# Patient Record
Sex: FEMALE | Race: BLACK OR AFRICAN AMERICAN | NOT HISPANIC OR LATINO
[De-identification: names, ages, dates, MRNs, and addresses within clinical notes are randomized per-mention and may not be internally consistent; named-entity substitution may affect disease eponyms.]

---

## 2023-11-07 ENCOUNTER — APPOINTMENT (OUTPATIENT)
Dept: OTOLARYNGOLOGY | Facility: CLINIC | Age: 83
End: 2023-11-07
Payer: MEDICARE

## 2023-11-07 VITALS
DIASTOLIC BLOOD PRESSURE: 73 MMHG | WEIGHT: 180 LBS | HEIGHT: 65 IN | HEART RATE: 82 BPM | BODY MASS INDEX: 29.99 KG/M2 | SYSTOLIC BLOOD PRESSURE: 170 MMHG | TEMPERATURE: 96.4 F

## 2023-11-07 DIAGNOSIS — Z83.3 FAMILY HISTORY OF DIABETES MELLITUS: ICD-10-CM

## 2023-11-07 DIAGNOSIS — Z80.9 FAMILY HISTORY OF MALIGNANT NEOPLASM, UNSPECIFIED: ICD-10-CM

## 2023-11-07 PROBLEM — Z00.00 ENCOUNTER FOR PREVENTIVE HEALTH EXAMINATION: Status: ACTIVE | Noted: 2023-11-07

## 2023-11-07 PROCEDURE — 99204 OFFICE O/P NEW MOD 45 MIN: CPT | Mod: 25

## 2023-11-07 PROCEDURE — 31231 NASAL ENDOSCOPY DX: CPT

## 2023-11-07 RX ORDER — SULFAMETHOXAZOLE AND TRIMETHOPRIM 800; 160 MG/1; MG/1
800-160 TABLET ORAL TWICE DAILY
Qty: 28 | Refills: 0 | Status: ACTIVE | COMMUNITY
Start: 2023-11-07 | End: 1900-01-01

## 2023-11-07 RX ORDER — PREDNISONE 10 MG/1
10 TABLET ORAL
Qty: 36 | Refills: 0 | Status: ACTIVE | COMMUNITY
Start: 2023-11-07 | End: 1900-01-01

## 2023-11-07 RX ORDER — FLUTICASONE PROPIONATE 44 UG/1
44 AEROSOL, METERED RESPIRATORY (INHALATION)
Refills: 0 | Status: ACTIVE | COMMUNITY

## 2023-11-07 RX ORDER — INSULIN ASPART 100 [IU]/ML
100 INJECTION, SOLUTION INTRAVENOUS; SUBCUTANEOUS
Refills: 0 | Status: ACTIVE | COMMUNITY

## 2023-11-07 RX ORDER — ALBUTEROL SULFATE 2.5 MG/3ML
(2.5 MG/3ML) VIAL, NEBULIZER (ML) INHALATION
Refills: 0 | Status: ACTIVE | COMMUNITY

## 2023-11-13 LAB — EAR NOSE AND THROAT CULTURE: ABNORMAL

## 2023-11-18 ENCOUNTER — APPOINTMENT (OUTPATIENT)
Dept: CT IMAGING | Facility: CLINIC | Age: 83
End: 2023-11-18
Payer: MEDICARE

## 2023-11-18 ENCOUNTER — OUTPATIENT (OUTPATIENT)
Dept: OUTPATIENT SERVICES | Facility: HOSPITAL | Age: 83
LOS: 1 days | End: 2023-11-18
Payer: MEDICARE

## 2023-11-18 DIAGNOSIS — J45.909 UNSPECIFIED ASTHMA, UNCOMPLICATED: ICD-10-CM

## 2023-11-18 DIAGNOSIS — J33.9 NASAL POLYP, UNSPECIFIED: ICD-10-CM

## 2023-11-18 DIAGNOSIS — J32.4 CHRONIC PANSINUSITIS: ICD-10-CM

## 2023-11-18 PROCEDURE — 70486 CT MAXILLOFACIAL W/O DYE: CPT

## 2023-11-18 PROCEDURE — 70486 CT MAXILLOFACIAL W/O DYE: CPT | Mod: 26,MH

## 2023-11-28 ENCOUNTER — APPOINTMENT (OUTPATIENT)
Dept: PULMONOLOGY | Facility: CLINIC | Age: 83
End: 2023-11-28
Payer: MEDICARE

## 2023-11-28 ENCOUNTER — LABORATORY RESULT (OUTPATIENT)
Age: 83
End: 2023-11-28

## 2023-11-28 VITALS
OXYGEN SATURATION: 97 % | DIASTOLIC BLOOD PRESSURE: 60 MMHG | HEIGHT: 65 IN | HEART RATE: 81 BPM | SYSTOLIC BLOOD PRESSURE: 125 MMHG | WEIGHT: 180.5 LBS | BODY MASS INDEX: 30.07 KG/M2 | TEMPERATURE: 97.4 F

## 2023-11-28 DIAGNOSIS — Z86.73 PERSONAL HISTORY OF TRANSIENT ISCHEMIC ATTACK (TIA), AND CEREBRAL INFARCTION W/OUT RESIDUAL DEFICITS: ICD-10-CM

## 2023-11-28 DIAGNOSIS — Z85.3 PERSONAL HISTORY OF MALIGNANT NEOPLASM OF BREAST: ICD-10-CM

## 2023-11-28 DIAGNOSIS — Z86.39 PERSONAL HISTORY OF OTHER ENDOCRINE, NUTRITIONAL AND METABOLIC DISEASE: ICD-10-CM

## 2023-11-28 PROCEDURE — 94060 EVALUATION OF WHEEZING: CPT

## 2023-11-28 PROCEDURE — 95012 NITRIC OXIDE EXP GAS DETER: CPT

## 2023-11-28 PROCEDURE — 99204 OFFICE O/P NEW MOD 45 MIN: CPT | Mod: 25

## 2023-11-28 PROCEDURE — 94729 DIFFUSING CAPACITY: CPT

## 2023-11-28 PROCEDURE — 94727 GAS DIL/WSHOT DETER LNG VOL: CPT

## 2023-11-29 LAB
BASOPHILS # BLD AUTO: 0.08 K/UL
BASOPHILS NFR BLD AUTO: 1.3 %
EOSINOPHIL # BLD AUTO: 0.47 K/UL
EOSINOPHIL NFR BLD AUTO: 7.8 %
HCT VFR BLD CALC: 34.8 %
HGB BLD-MCNC: 12 G/DL
IMM GRANULOCYTES NFR BLD AUTO: 0.2 %
LYMPHOCYTES # BLD AUTO: 2.07 K/UL
LYMPHOCYTES NFR BLD AUTO: 34.3 %
MAN DIFF?: NORMAL
MCHC RBC-ENTMCNC: 28.6 PG
MCHC RBC-ENTMCNC: 34.5 GM/DL
MCV RBC AUTO: 82.9 FL
MONOCYTES # BLD AUTO: 0.39 K/UL
MONOCYTES NFR BLD AUTO: 6.5 %
NEUTROPHILS # BLD AUTO: 3.01 K/UL
NEUTROPHILS NFR BLD AUTO: 49.9 %
PLATELET # BLD AUTO: 357 K/UL
RBC # BLD: 4.2 M/UL
RBC # FLD: 16.6 %
WBC # FLD AUTO: 6.03 K/UL

## 2023-11-30 LAB
A ALTERNATA IGE QN: <0.1 KUA/L
A FUMIGATUS IGE QN: <0.1 KUA/L
C ALBICANS IGE QN: <0.1 KUA/L
C HERBARUM IGE QN: <0.1 KUA/L
CAT DANDER IGE QN: <0.1 KUA/L
COMMON RAGWEED IGE QN: 0.37 KUA/L
D FARINAE IGE QN: <0.1 KUA/L
D PTERONYSS IGE QN: <0.1 KUA/L
DEPRECATED A ALTERNATA IGE RAST QL: 0 (ref 0–?)
DEPRECATED A FUMIGATUS IGE RAST QL: 0 (ref 0–?)
DEPRECATED C ALBICANS IGE RAST QL: 0
DEPRECATED C HERBARUM IGE RAST QL: 0 (ref 0–?)
DEPRECATED CAT DANDER IGE RAST QL: 0 (ref 0–?)
DEPRECATED COMMON RAGWEED IGE RAST QL: 1 (ref 0–?)
DEPRECATED D FARINAE IGE RAST QL: 0 (ref 0–?)
DEPRECATED D PTERONYSS IGE RAST QL: 0 (ref 0–?)
DEPRECATED DOG DANDER IGE RAST QL: 2 (ref 0–?)
DEPRECATED M RACEMOSUS IGE RAST QL: 0
DEPRECATED ROACH IGE RAST QL: NORMAL (ref 0–?)
DEPRECATED TIMOTHY IGE RAST QL: 4 (ref 0–?)
DEPRECATED WHITE OAK IGE RAST QL: 2 (ref 0–?)
DOG DANDER IGE QN: 0.84 KUA/L
M RACEMOSUS IGE QN: <0.1 KUA/L
ROACH IGE QN: 0.21 KUA/L
TIMOTHY IGE QN: 34.2 KUA/L
TOTAL IGE SMQN RAST: 1853 KU/L
WHITE OAK IGE QN: 0.7 KUA/L

## 2023-12-01 ENCOUNTER — APPOINTMENT (OUTPATIENT)
Dept: OTOLARYNGOLOGY | Facility: CLINIC | Age: 83
End: 2023-12-01
Payer: MEDICARE

## 2023-12-01 VITALS — BODY MASS INDEX: 29.99 KG/M2 | TEMPERATURE: 97.4 F | HEIGHT: 65 IN | WEIGHT: 180 LBS

## 2023-12-01 PROCEDURE — 99213 OFFICE O/P EST LOW 20 MIN: CPT | Mod: 25

## 2023-12-01 PROCEDURE — 31231 NASAL ENDOSCOPY DX: CPT

## 2023-12-21 RX ORDER — SULFAMETHOXAZOLE AND TRIMETHOPRIM 800; 160 MG/1; MG/1
800-160 TABLET ORAL TWICE DAILY
Qty: 28 | Refills: 0 | Status: ACTIVE | COMMUNITY
Start: 2023-12-21 | End: 1900-01-01

## 2024-01-24 ENCOUNTER — APPOINTMENT (OUTPATIENT)
Dept: PULMONOLOGY | Facility: CLINIC | Age: 84
End: 2024-01-24
Payer: MEDICARE

## 2024-01-24 VITALS
DIASTOLIC BLOOD PRESSURE: 60 MMHG | BODY MASS INDEX: 30.07 KG/M2 | SYSTOLIC BLOOD PRESSURE: 130 MMHG | OXYGEN SATURATION: 97 % | HEIGHT: 65 IN | WEIGHT: 180.5 LBS | HEART RATE: 78 BPM | TEMPERATURE: 97.2 F

## 2024-01-24 PROCEDURE — 94010 BREATHING CAPACITY TEST: CPT

## 2024-01-24 PROCEDURE — 99213 OFFICE O/P EST LOW 20 MIN: CPT | Mod: 25

## 2024-01-24 PROCEDURE — 95012 NITRIC OXIDE EXP GAS DETER: CPT

## 2024-01-24 RX ORDER — BUDESONIDE, GLYCOPYRROLATE, AND FORMOTEROL FUMARATE 160; 9; 4.8 UG/1; UG/1; UG/1
160-9-4.8 AEROSOL, METERED RESPIRATORY (INHALATION)
Qty: 3 | Refills: 3 | Status: ACTIVE | COMMUNITY
Start: 2023-11-28 | End: 1900-01-01

## 2024-01-28 LAB
A FLAVUS AB FLD QL: NEGATIVE
A FUMIGATUS AB FLD QL: NEGATIVE
A NIGER AB FLD QL: NEGATIVE

## 2024-01-31 ENCOUNTER — APPOINTMENT (OUTPATIENT)
Dept: OTOLARYNGOLOGY | Facility: HOSPITAL | Age: 84
End: 2024-01-31

## 2024-01-31 ENCOUNTER — NON-APPOINTMENT (OUTPATIENT)
Age: 84
End: 2024-01-31

## 2024-02-06 ENCOUNTER — APPOINTMENT (OUTPATIENT)
Dept: CT IMAGING | Facility: IMAGING CENTER | Age: 84
End: 2024-02-06
Payer: MEDICARE

## 2024-02-06 ENCOUNTER — OUTPATIENT (OUTPATIENT)
Dept: OUTPATIENT SERVICES | Facility: HOSPITAL | Age: 84
LOS: 1 days | End: 2024-02-06
Payer: MEDICARE

## 2024-02-06 DIAGNOSIS — J45.909 UNSPECIFIED ASTHMA, UNCOMPLICATED: ICD-10-CM

## 2024-02-06 PROCEDURE — 71250 CT THORAX DX C-: CPT | Mod: 26,MH

## 2024-02-06 PROCEDURE — 71250 CT THORAX DX C-: CPT

## 2024-02-08 ENCOUNTER — APPOINTMENT (OUTPATIENT)
Dept: OTOLARYNGOLOGY | Facility: CLINIC | Age: 84
End: 2024-02-08
Payer: MEDICARE

## 2024-02-08 PROCEDURE — 99213 OFFICE O/P EST LOW 20 MIN: CPT

## 2024-02-08 NOTE — CONSULT LETTER
[Dear  ___] : Dear  [unfilled], [Courtesy Letter:] : I had the pleasure of seeing your patient, [unfilled], in my office today. [Consult Closing:] : Thank you very much for allowing me to participate in the care of this patient.  If you have any questions, please do not hesitate to contact me. [Sincerely,] : Sincerely, [FreeTextEntry3] : Jeremie Krause MD Department of Otolaryngology - Head and Neck Surgery [DrPau  ___] : Dr. MA

## 2024-02-08 NOTE — PROCEDURE
[FreeTextEntry3] : Nasal Endoscopy: near complete nasal airway obstruction due to polyps w mucoid debris and crusting

## 2024-02-08 NOTE — HISTORY OF PRESENT ILLNESS
[de-identified] : 84F here in followup.  She had been scheduled for sinus surgery 1/31/24 but surgery was cancelled the previous day since CXR appeared abnormal. She has since gotten CT chest as recommended. She has not spoken with her PCP or pulmonologist for review yet.  She has severe pansinusitis w polyposis/AERD with eosinophilia and elevated IgEs in addition to severe asthma on triple therapy. For the past year and half or so, she has been suffering from severe nasal congestion, green/yellow mucus, thick postnasal drip, shortness of breath and absent smell/taste with difficult to control asthma and poor sleep. She feels this all started after going on dupixent 5/2022, after which she developed a sinusitis followed by pneumonia with persistent asthma. She then suffered a stroke 12/2022 and is on AC and sx persist.  She has long standing h/o chronic sinusitis/polyposis with asthma and ASA/NSAID allergy. She has SERENITY and has CPAP but just got a new machine which does not work as well.  CT Sinus 11/18/23 (I reviewed): -near complete pansinus opacification w diffusely thickened osteitic bone throughout -soft tissue polypoid extension into both nasal cavities   ROS otherwise unremarkable.

## 2024-02-08 NOTE — ASSESSMENT
[FreeTextEntry1] : 84F here in followup. She had been scheduled for sinus surgery 1/31/24 but surgery was cancelled the previous day since CXR appeared abnormal. She has since gotten CT chest as recommended. She has not spoken with her PCP or pulmonologist for review yet. She has severe pansinusitis w polyposis/AERD with eosinophilia and elevated IgEs in addition to severe asthma on triple therapy.  For the past year and half or so, she has been suffering from debilitating constellation of sx including severe nasal congestion, green/yellow mucus, thick postnasal drip, shortness of breath and absent smell/taste with difficult to control asthma and poor sleep. She feels this all started after going on dupixent 5/2022, after which she developed a sinusitis followed by pneumonia with persistent asthma. She then suffered a stroke 12/2022 and is on AC and sx persist. She has long standing h/o chronic sinusitis/polyposis with asthma and ASA/NSAID allergy and underwent prior surgeries years ago. She has SERENITY and has CPAP but just got a new machine which does not work as well. CT shows near complete pansinus opacification w diffusely thickened osteitic bone throughout with-soft tissue polypoid extension into both nasal cavities. On exam, nasal endoscopy shows near complete nasal airway obstruction due to polyps, debris and crusting. She has aspirin-exacerbated respiratory disease which places her at the inflammatory extreme of upper and lower airway inflammation. There is a very high inflammatory polyp burden on exam and imaging which is significantly affecting her quality of life.  I again reiterated need for sinus surgery in way to reset her inflammatory cycle. She just had CT chest as recommended and I await clearance from PCP and pulmonologist so we can move forward safely with surgery as planned, which I reiterated w pt. I let Dr. Becerra know. Pt will keep me informed.

## 2024-02-08 NOTE — DATA REVIEWED
[de-identified] : CT Sinus 11/18/23: FINDINGS: Sinocranial & sinoorbital junctions: The lamina papyracea, cribriform plates and fovea ethmoidalis are intact.  Nasal septum and nasal cavity: Nasal prosthesis is noted overlying the nasal bones/cartilage. S-shaped nasal septal deviation. Soft tissue opacification throughout the bilateral nasal passages corresponding with history of polyposis.  Frontal sinuses, drainage pathways, and associated anatomic variants: Near-complete opacification of the frontal sinuses with chronic wall thickening. Opacified bilateral frontal outflow tracts.  Ethmoid sinuses: Near complete opacification bilaterally.  Sphenoid sinuses, drainage pathways, and associated variants: Mild right and severe left sphenoid sinus opacification. Opacification of the bilateral sphenoethmoidal recesses. The carotid canals are covered by bone.  Maxillary sinuses, drainage pathways, and associated variants: Complete opacification of the bilateral maxillary sinuses and ostiomeatal units. Central mild hyperdensity could represent inspissated secretions and/or fungal overgrowth.  Other: Partially visualized intracranial structures: Normal Orbits: Normal Mastoid air cells: Trace left mastoid effusion.   IMPRESSION: Severe inflammatory changes throughout the paranasal sinuses and their respective drainage pathways.  Soft tissue opacification in the bilateral nasal passages corresponding with given history of polyposis.  S-shaped nasal septal deviation.

## 2024-02-28 ENCOUNTER — APPOINTMENT (OUTPATIENT)
Dept: PULMONOLOGY | Facility: CLINIC | Age: 84
End: 2024-02-28
Payer: MEDICARE

## 2024-02-28 VITALS
TEMPERATURE: 97.3 F | OXYGEN SATURATION: 96 % | SYSTOLIC BLOOD PRESSURE: 128 MMHG | BODY MASS INDEX: 30.99 KG/M2 | DIASTOLIC BLOOD PRESSURE: 76 MMHG | HEART RATE: 87 BPM | HEIGHT: 65 IN | WEIGHT: 186 LBS

## 2024-02-28 PROCEDURE — 99213 OFFICE O/P EST LOW 20 MIN: CPT

## 2024-02-28 RX ORDER — PREDNISONE 20 MG/1
20 TABLET ORAL
Qty: 6 | Refills: 0 | Status: ACTIVE | COMMUNITY
Start: 2024-02-28 | End: 1900-01-01

## 2024-03-14 ENCOUNTER — APPOINTMENT (OUTPATIENT)
Dept: CT IMAGING | Facility: CLINIC | Age: 84
End: 2024-03-14

## 2024-03-19 ENCOUNTER — TRANSCRIPTION ENCOUNTER (OUTPATIENT)
Age: 84
End: 2024-03-19

## 2024-03-19 NOTE — ASU PATIENT PROFILE, ADULT - NSICDXPASTSURGICALHX_GEN_ALL_CORE_FT
PAST SURGICAL HISTORY:  H/O lumpectomy     H/O nasal polypectomy     History of tonsillectomy and adenoidectomy     S/P dilatation and curettage

## 2024-03-19 NOTE — ASU PATIENT PROFILE, ADULT - NS PREOP UNDERSTANDS INFO
no solids after 10pm, clears allowed up to 630am, bring ID and insurance card, no valuables or jewelry, wear comfortable clothing/yes

## 2024-03-19 NOTE — ASU PATIENT PROFILE, ADULT - NSICDXPASTMEDICALHX_GEN_ALL_CORE_FT
PAST MEDICAL HISTORY:  Breast cancer     Cataract, bilateral     CVA (cerebral vascular accident)     Diabetes mellitus     HTN (hypertension)     SERENITY (obstructive sleep apnea)      Suture Removal: 14 days

## 2024-03-20 ENCOUNTER — RESULT REVIEW (OUTPATIENT)
Age: 84
End: 2024-03-20

## 2024-03-20 ENCOUNTER — APPOINTMENT (OUTPATIENT)
Dept: OTOLARYNGOLOGY | Facility: HOSPITAL | Age: 84
End: 2024-03-20

## 2024-03-20 ENCOUNTER — TRANSCRIPTION ENCOUNTER (OUTPATIENT)
Age: 84
End: 2024-03-20

## 2024-03-20 ENCOUNTER — OUTPATIENT (OUTPATIENT)
Dept: OUTPATIENT SERVICES | Facility: HOSPITAL | Age: 84
LOS: 1 days | Discharge: HOME | End: 2024-03-20
Payer: MEDICARE

## 2024-03-20 VITALS
RESPIRATION RATE: 18 BRPM | HEART RATE: 96 BPM | DIASTOLIC BLOOD PRESSURE: 63 MMHG | SYSTOLIC BLOOD PRESSURE: 132 MMHG | OXYGEN SATURATION: 98 %

## 2024-03-20 VITALS
DIASTOLIC BLOOD PRESSURE: 70 MMHG | SYSTOLIC BLOOD PRESSURE: 144 MMHG | HEIGHT: 65 IN | RESPIRATION RATE: 16 BRPM | TEMPERATURE: 98 F | OXYGEN SATURATION: 100 % | HEART RATE: 78 BPM | WEIGHT: 185.85 LBS

## 2024-03-20 DIAGNOSIS — Z98.890 OTHER SPECIFIED POSTPROCEDURAL STATES: Chronic | ICD-10-CM

## 2024-03-20 DIAGNOSIS — Z90.89 ACQUIRED ABSENCE OF OTHER ORGANS: Chronic | ICD-10-CM

## 2024-03-20 LAB
GLUCOSE BLDC GLUCOMTR-MCNC: 127 MG/DL — HIGH (ref 70–99)
GLUCOSE BLDC GLUCOMTR-MCNC: 218 MG/DL — HIGH (ref 70–99)
GLUCOSE BLDC GLUCOMTR-MCNC: 273 MG/DL — HIGH (ref 70–99)
GLUCOSE BLDC GLUCOMTR-MCNC: 287 MG/DL — HIGH (ref 70–99)

## 2024-03-20 PROCEDURE — 88304 TISSUE EXAM BY PATHOLOGIST: CPT | Mod: 26

## 2024-03-20 PROCEDURE — 31288 NASAL/SINUS ENDOSCOPY SURG: CPT | Mod: 50

## 2024-03-20 PROCEDURE — 31240 NSL/SNS NDSC CNCH BULL RESCJ: CPT | Mod: 50

## 2024-03-20 PROCEDURE — 31253 NSL/SINS NDSC TOTAL: CPT | Mod: 50

## 2024-03-20 PROCEDURE — 31267 ENDOSCOPY MAXILLARY SINUS: CPT | Mod: 50

## 2024-03-20 PROCEDURE — 88311 DECALCIFY TISSUE: CPT | Mod: 26

## 2024-03-20 PROCEDURE — 88305 TISSUE EXAM BY PATHOLOGIST: CPT | Mod: 26

## 2024-03-20 PROCEDURE — 61782 SCAN PROC CRANIAL EXTRA: CPT

## 2024-03-20 PROCEDURE — 30130 EXCISE INFERIOR TURBINATE: CPT | Mod: 50

## 2024-03-20 DEVICE — SHT XOMED 0.010X025: Type: IMPLANTABLE DEVICE | Status: FUNCTIONAL

## 2024-03-20 DEVICE — STENT DRUG ELUTING SINUS BIO ABSORB INTERSECT ENT PROPEL 23MM: Type: IMPLANTABLE DEVICE | Status: FUNCTIONAL

## 2024-03-20 DEVICE — SYS CATH BALLOON RELIEVA SINUS: Type: IMPLANTABLE DEVICE | Status: FUNCTIONAL

## 2024-03-20 DEVICE — STENT SINUS DRUG ELUDING PROPEL CONTOUR: Type: IMPLANTABLE DEVICE | Status: FUNCTIONAL

## 2024-03-20 RX ORDER — ACETAMINOPHEN 500 MG
1000 TABLET ORAL ONCE
Refills: 0 | Status: COMPLETED | OUTPATIENT
Start: 2024-03-20 | End: 2024-03-20

## 2024-03-20 RX ORDER — HYDROMORPHONE HYDROCHLORIDE 2 MG/ML
0.25 INJECTION INTRAMUSCULAR; INTRAVENOUS; SUBCUTANEOUS
Refills: 0 | Status: DISCONTINUED | OUTPATIENT
Start: 2024-03-20 | End: 2024-03-20

## 2024-03-20 RX ORDER — OXYCODONE AND ACETAMINOPHEN 5; 325 MG/1; MG/1
5-325 TABLET ORAL
Qty: 20 | Refills: 0 | Status: ACTIVE | COMMUNITY
Start: 2024-03-20 | End: 1900-01-01

## 2024-03-20 RX ORDER — ALBUTEROL 90 UG/1
2.5 AEROSOL, METERED ORAL ONCE
Refills: 0 | Status: COMPLETED | OUTPATIENT
Start: 2024-03-20 | End: 2024-03-20

## 2024-03-20 RX ORDER — RACEPINEPHRINE HCL 2.25 %
1 SOLUTION, NON-ORAL TOPICAL ONCE
Refills: 0 | Status: DISCONTINUED | OUTPATIENT
Start: 2024-03-20 | End: 2024-03-20

## 2024-03-20 RX ORDER — FENTANYL CITRATE 50 UG/ML
25 INJECTION INTRAVENOUS
Refills: 0 | Status: DISCONTINUED | OUTPATIENT
Start: 2024-03-20 | End: 2024-03-20

## 2024-03-20 RX ORDER — CLOPIDOGREL BISULFATE 75 MG/1
1 TABLET, FILM COATED ORAL
Refills: 0 | DISCHARGE

## 2024-03-20 RX ORDER — PREDNISONE 10 MG/1
10 TABLET ORAL
Qty: 38 | Refills: 0 | Status: ACTIVE | COMMUNITY
Start: 2024-03-20 | End: 1900-01-01

## 2024-03-20 RX ORDER — INSULIN ASPART 100 [IU]/ML
0 INJECTION, SOLUTION SUBCUTANEOUS
Refills: 0 | DISCHARGE

## 2024-03-20 RX ORDER — INSULIN GLARGINE 100 [IU]/ML
0 INJECTION, SOLUTION SUBCUTANEOUS
Refills: 0 | DISCHARGE

## 2024-03-20 RX ORDER — SODIUM CHLORIDE 9 MG/ML
500 INJECTION, SOLUTION INTRAVENOUS
Refills: 0 | Status: DISCONTINUED | OUTPATIENT
Start: 2024-03-20 | End: 2024-03-20

## 2024-03-20 RX ORDER — FUROSEMIDE 40 MG
1 TABLET ORAL
Refills: 0 | DISCHARGE

## 2024-03-20 RX ORDER — APREPITANT 80 MG/1
40 CAPSULE ORAL ONCE
Refills: 0 | Status: COMPLETED | OUTPATIENT
Start: 2024-03-20 | End: 2024-03-20

## 2024-03-20 RX ADMIN — Medication 1000 MILLIGRAM(S): at 18:24

## 2024-03-20 RX ADMIN — ALBUTEROL 2.5 MILLIGRAM(S): 90 AEROSOL, METERED ORAL at 16:22

## 2024-03-20 RX ADMIN — APREPITANT 40 MILLIGRAM(S): 80 CAPSULE ORAL at 08:45

## 2024-03-20 RX ADMIN — ALBUTEROL 2.5 MILLIGRAM(S): 90 AEROSOL, METERED ORAL at 11:01

## 2024-03-20 RX ADMIN — Medication 400 MILLIGRAM(S): at 18:09

## 2024-03-20 NOTE — PRE-ANESTHESIA EVALUATION ADULT - NSANTHADDINFOFT_GEN_ALL_CORE
Pt accepts all anesthesia risks .IBNLT: Dental, Pharyngeal, Laryngeal, Tracheal Trauma, Sore Throat, Hoarseness, Recurrent Laryngeal Nerve Dysfunction, Upper and Lower Extremity Paresthesias, Nausea and Vomiting, Potential exacerbation of asthma and SERENITY.

## 2024-03-20 NOTE — ASU DISCHARGE PLAN (ADULT/PEDIATRIC) - CARE COORDINATION DISCHARGE PLANNING
Blood work today.     Increase Lantus dose to 40 units daily.     Continue with mealtime insulin (HUMOLOG)  of 1 unit for every 7 grams of carbohydrates with meals and snacks.; reduce dinner time carbohydrate coverage to 1 unit for every 8 grams of carbohydrates.     Please follow this correction scale with meals three times per day and at bedtime USING HUMOLOG insulin.   For  to 174 give 1 units.  For  to 209 give 2 units.  For  to 244 give 3 units.  For  to 279 give 4 units.  For  to 314 give 5 units.  For  to 359 give 6 units.  For BG >/= to 360 give 7 units.        [facility]-Department of Endocrinology  Clarissa Francis RN, Diabetes Educator: [phone]  Clinic Nurses HARRISON Michel; CMA's: Jane [phone]  WILL AndradeN : [phone]  Clinic Fax: [phone]  On-Call Endocrine at the [facility] (after hours/weekends): [phone] option 4  Scheduling Line: [phone]    Appointment Reminders:  * Please bring meter with for staff to download  * If you are due ONLY for an A1C, it is scheduled with the nurse and will be done in clinic. You do not need to schedule a lab appointment. Fasting is not required for an A1C.  * Refill request should be submitted to your pharmacy. They will contact clinic for approval.      
No

## 2024-03-20 NOTE — ASU DISCHARGE PLAN (ADULT/PEDIATRIC) - NS MD DC FALL RISK RISK
For information on Fall & Injury Prevention, visit: [email or web address] OR  [email or web address] OR  https://www.cdc.gov/steadi/patient.html

## 2024-03-20 NOTE — BRIEF OPERATIVE NOTE - OPERATION/FINDINGS
FESS w/ b/l maxillary antrostomy, anterior and posterior ethmoidectomy, sphenoidotomy, and frontal sinus balloon dilation w/ contour and propel stent placement  b/l ITR

## 2024-03-20 NOTE — ASU DISCHARGE PLAN (ADULT/PEDIATRIC) - CARE PROVIDER_API CALL
Jeremie Krause  Otolaryngology  [address] [place] [address], [address]  [place] [address]  Phone: [phone]  Fax: [phone]  Follow Up Time:

## 2024-03-21 LAB
GRAM STN FLD: ABNORMAL
GRAM STN FLD: SIGNIFICANT CHANGE UP
SPECIMEN SOURCE: SIGNIFICANT CHANGE UP

## 2024-03-21 RX ORDER — BUDESONIDE 0.5 MG/2ML
0.5 INHALANT ORAL
Qty: 6 | Refills: 2 | Status: ACTIVE | COMMUNITY
Start: 2024-03-20 | End: 1900-01-01

## 2024-03-22 PROBLEM — H26.9 UNSPECIFIED CATARACT: Chronic | Status: ACTIVE | Noted: 2024-03-19

## 2024-03-22 PROBLEM — G47.33 OBSTRUCTIVE SLEEP APNEA (ADULT) (PEDIATRIC): Chronic | Status: ACTIVE | Noted: 2024-03-19

## 2024-03-22 PROBLEM — E11.9 TYPE 2 DIABETES MELLITUS WITHOUT COMPLICATIONS: Chronic | Status: ACTIVE | Noted: 2024-03-19

## 2024-03-22 PROBLEM — I63.9 CEREBRAL INFARCTION, UNSPECIFIED: Chronic | Status: ACTIVE | Noted: 2024-03-19

## 2024-03-22 PROBLEM — I10 ESSENTIAL (PRIMARY) HYPERTENSION: Chronic | Status: ACTIVE | Noted: 2024-03-19

## 2024-03-22 PROBLEM — C50.919 MALIGNANT NEOPLASM OF UNSPECIFIED SITE OF UNSPECIFIED FEMALE BREAST: Chronic | Status: ACTIVE | Noted: 2024-03-19

## 2024-03-22 LAB
-  AMPICILLIN/SULBACTAM: SIGNIFICANT CHANGE UP
-  AMPICILLIN: SIGNIFICANT CHANGE UP
-  CEFAZOLIN: SIGNIFICANT CHANGE UP
-  CEFEPIME: SIGNIFICANT CHANGE UP
-  CEFTRIAXONE: SIGNIFICANT CHANGE UP
-  CIPROFLOXACIN: SIGNIFICANT CHANGE UP
-  CLINDAMYCIN: SIGNIFICANT CHANGE UP
-  DAPTOMYCIN: SIGNIFICANT CHANGE UP
-  ERTAPENEM: SIGNIFICANT CHANGE UP
-  ERYTHROMYCIN: SIGNIFICANT CHANGE UP
-  GENTAMICIN: SIGNIFICANT CHANGE UP
-  LINEZOLID: SIGNIFICANT CHANGE UP
-  OXACILLIN: SIGNIFICANT CHANGE UP
-  PIPERACILLIN/TAZOBACTAM: SIGNIFICANT CHANGE UP
-  RIFAMPIN: SIGNIFICANT CHANGE UP
-  TETRACYCLINE: SIGNIFICANT CHANGE UP
-  TOBRAMYCIN: SIGNIFICANT CHANGE UP
-  TRIMETHOPRIM/SULFAMETHOXAZOLE: SIGNIFICANT CHANGE UP
-  TRIMETHOPRIM/SULFAMETHOXAZOLE: SIGNIFICANT CHANGE UP
METHOD TYPE: SIGNIFICANT CHANGE UP
METHOD TYPE: SIGNIFICANT CHANGE UP

## 2024-03-23 LAB
-  VANCOMYCIN: SIGNIFICANT CHANGE UP
CULTURE RESULTS: ABNORMAL
METHOD TYPE: SIGNIFICANT CHANGE UP
ORGANISM # SPEC MICROSCOPIC CNT: ABNORMAL
ORGANISM # SPEC MICROSCOPIC CNT: SIGNIFICANT CHANGE UP
SPECIMEN SOURCE: SIGNIFICANT CHANGE UP

## 2024-03-27 ENCOUNTER — APPOINTMENT (OUTPATIENT)
Dept: PULMONOLOGY | Facility: CLINIC | Age: 84
End: 2024-03-27
Payer: MEDICARE

## 2024-03-27 VITALS
BODY MASS INDEX: 30.99 KG/M2 | WEIGHT: 186 LBS | TEMPERATURE: 97.5 F | OXYGEN SATURATION: 94 % | HEIGHT: 65 IN | HEART RATE: 94 BPM

## 2024-03-27 PROCEDURE — 99213 OFFICE O/P EST LOW 20 MIN: CPT

## 2024-03-29 ENCOUNTER — APPOINTMENT (OUTPATIENT)
Dept: OTOLARYNGOLOGY | Facility: CLINIC | Age: 84
End: 2024-03-29
Payer: MEDICARE

## 2024-03-29 DIAGNOSIS — J45.909 UNSPECIFIED ASTHMA, UNCOMPLICATED: ICD-10-CM

## 2024-03-29 DIAGNOSIS — Z88.6 UNSPECIFIED ASTHMA, UNCOMPLICATED: ICD-10-CM

## 2024-03-29 DIAGNOSIS — J33.9 UNSPECIFIED ASTHMA, UNCOMPLICATED: ICD-10-CM

## 2024-03-29 PROCEDURE — 31237 NSL/SINS NDSC SURG BX POLYPC: CPT | Mod: 50,58

## 2024-03-29 PROCEDURE — 99024 POSTOP FOLLOW-UP VISIT: CPT

## 2024-03-29 RX ORDER — SULFAMETHOXAZOLE AND TRIMETHOPRIM 800; 160 MG/1; MG/1
800-160 TABLET ORAL TWICE DAILY
Qty: 28 | Refills: 0 | Status: ACTIVE | COMMUNITY
Start: 2024-03-29 | End: 1900-01-01

## 2024-03-29 NOTE — HISTORY OF PRESENT ILLNESS
[de-identified] : 84F here in first postoperative visit s/p ESS (maxillary, ethmoid, sphenoid, frontal) 3/20/24 for chronic pansinusitis with polyposis. Intraoperatively, severe polyposis over septum and expanding olfactory clefts, severe mucosal edema and osteitic bone throughout.  She is doing well since surgery. She has been using budesonide rinses BID and has been tapering steroids as sent. There is no rhinorrhea, pain or headache.  OR Cx: -rare MRSA -rare citrobacter  Pathology: pending  ROS otherwise unremarkable

## 2024-03-29 NOTE — CONSULT LETTER
[Dear  ___] : Dear  [unfilled], [Courtesy Letter:] : I had the pleasure of seeing your patient, [unfilled], in my office today. [Consult Closing:] : Thank you very much for allowing me to participate in the care of this patient.  If you have any questions, please do not hesitate to contact me. [Sincerely,] : Sincerely, [DrPau  ___] : Dr. MA [FreeTextEntry3] : Jeremie Krause MD Department of Otolaryngology - Head and Neck Surgery

## 2024-03-29 NOTE — DATA REVIEWED
[de-identified] : CT Sinus 11/18/23: FINDINGS: Sinocranial & sinoorbital junctions: The lamina papyracea, cribriform plates and fovea ethmoidalis are intact.  Nasal septum and nasal cavity: Nasal prosthesis is noted overlying the nasal bones/cartilage. S-shaped nasal septal deviation. Soft tissue opacification throughout the bilateral nasal passages corresponding with history of polyposis.  Frontal sinuses, drainage pathways, and associated anatomic variants: Near-complete opacification of the frontal sinuses with chronic wall thickening. Opacified bilateral frontal outflow tracts.  Ethmoid sinuses: Near complete opacification bilaterally.  Sphenoid sinuses, drainage pathways, and associated variants: Mild right and severe left sphenoid sinus opacification. Opacification of the bilateral sphenoethmoidal recesses. The carotid canals are covered by bone.  Maxillary sinuses, drainage pathways, and associated variants: Complete opacification of the bilateral maxillary sinuses and ostiomeatal units. Central mild hyperdensity could represent inspissated secretions and/or fungal overgrowth.  Other: Partially visualized intracranial structures: Normal Orbits: Normal Mastoid air cells: Trace left mastoid effusion.   IMPRESSION: Severe inflammatory changes throughout the paranasal sinuses and their respective drainage pathways.  Soft tissue opacification in the bilateral nasal passages corresponding with given history of polyposis.  S-shaped nasal septal deviation.

## 2024-03-29 NOTE — PROCEDURE
[FreeTextEntry3] : Nasal Endoscopy: copious crusting, mucoid debris and scabbing removed propels and contours removed nasal airways and paranasal sinuses widely patent mucoid debris throughout, suctioned and cx scab over bilateral anterior septum?

## 2024-03-29 NOTE — ASSESSMENT
[FreeTextEntry1] : 84F here in first postoperative visit s/p ESS (maxillary, ethmoid, sphenoid, frontal) 3/20/24 for chronic pansinusitis with polyposis. Intraoperatively, severe polyposis over septum and expanding olfactory clefts, severe mucosal edema and osteitic bone throughout. She is doing well since surgery. She has been using budesonide rinses BID and has been tapering steroids as sent. There is no rhinorrhea, pain or headache. On exam, nasal endoscopy shows expected postoperative changes with widely patent nasal airways and middle meati. Copious amounts of crusting and mucoid debris was removed. She felt much better after debridement. She is doing well s/p extensive ESS for pansinusitis and severe polyposis. There was much more debris and edema and drainage on exam today, but after debridement is heling well. For now, continue budesonide rinses BID - will increase to 1mg BID. Will also give course bactrim and f/u cx from today. Gentle noseblowing. RTO 4 weeks in routine followup.

## 2024-04-03 LAB — SURGICAL PATHOLOGY STUDY: SIGNIFICANT CHANGE UP

## 2024-04-04 LAB
EAR NOSE AND THROAT CULTURE: ABNORMAL
EAR NOSE AND THROAT CULTURE: ABNORMAL

## 2024-04-08 ENCOUNTER — APPOINTMENT (OUTPATIENT)
Dept: PULMONOLOGY | Facility: CLINIC | Age: 84
End: 2024-04-08
Payer: MEDICARE

## 2024-04-08 PROCEDURE — 99442: CPT | Mod: 93

## 2024-04-08 RX ORDER — PREDNISONE 20 MG/1
20 TABLET ORAL DAILY
Qty: 14 | Refills: 0 | Status: ACTIVE | COMMUNITY
Start: 2024-01-25 | End: 1900-01-01

## 2024-04-25 ENCOUNTER — APPOINTMENT (OUTPATIENT)
Dept: PULMONOLOGY | Facility: CLINIC | Age: 84
End: 2024-04-25
Payer: MEDICARE

## 2024-04-25 VITALS
HEART RATE: 88 BPM | OXYGEN SATURATION: 97 % | BODY MASS INDEX: 30.99 KG/M2 | WEIGHT: 186 LBS | DIASTOLIC BLOOD PRESSURE: 60 MMHG | HEIGHT: 65 IN | SYSTOLIC BLOOD PRESSURE: 112 MMHG | TEMPERATURE: 97.8 F

## 2024-04-25 DIAGNOSIS — J45.50 SEVERE PERSISTENT ASTHMA, UNCOMPLICATED: ICD-10-CM

## 2024-04-25 PROCEDURE — 99213 OFFICE O/P EST LOW 20 MIN: CPT

## 2024-04-25 PROCEDURE — G2211 COMPLEX E/M VISIT ADD ON: CPT

## 2024-04-26 ENCOUNTER — APPOINTMENT (OUTPATIENT)
Dept: OTOLARYNGOLOGY | Facility: CLINIC | Age: 84
End: 2024-04-26
Payer: MEDICARE

## 2024-04-26 VITALS
TEMPERATURE: 97.8 F | HEART RATE: 76 BPM | WEIGHT: 187 LBS | HEIGHT: 65 IN | SYSTOLIC BLOOD PRESSURE: 137 MMHG | DIASTOLIC BLOOD PRESSURE: 72 MMHG | BODY MASS INDEX: 31.16 KG/M2

## 2024-04-26 DIAGNOSIS — J32.4 CHRONIC PANSINUSITIS: ICD-10-CM

## 2024-04-26 DIAGNOSIS — J33.9 NASAL POLYP, UNSPECIFIED: ICD-10-CM

## 2024-04-26 PROCEDURE — 31237 NSL/SINS NDSC SURG BX POLYPC: CPT | Mod: 50,58

## 2024-04-26 PROCEDURE — 99024 POSTOP FOLLOW-UP VISIT: CPT

## 2024-04-26 NOTE — DATA REVIEWED
[de-identified] : CT Sinus 11/18/23: FINDINGS: Sinocranial & sinoorbital junctions: The lamina papyracea, cribriform plates and fovea ethmoidalis are intact.  Nasal septum and nasal cavity: Nasal prosthesis is noted overlying the nasal bones/cartilage. S-shaped nasal septal deviation. Soft tissue opacification throughout the bilateral nasal passages corresponding with history of polyposis.  Frontal sinuses, drainage pathways, and associated anatomic variants: Near-complete opacification of the frontal sinuses with chronic wall thickening. Opacified bilateral frontal outflow tracts.  Ethmoid sinuses: Near complete opacification bilaterally.  Sphenoid sinuses, drainage pathways, and associated variants: Mild right and severe left sphenoid sinus opacification. Opacification of the bilateral sphenoethmoidal recesses. The carotid canals are covered by bone.  Maxillary sinuses, drainage pathways, and associated variants: Complete opacification of the bilateral maxillary sinuses and ostiomeatal units. Central mild hyperdensity could represent inspissated secretions and/or fungal overgrowth.  Other: Partially visualized intracranial structures: Normal Orbits: Normal Mastoid air cells: Trace left mastoid effusion.   IMPRESSION: Severe inflammatory changes throughout the paranasal sinuses and their respective drainage pathways.  Soft tissue opacification in the bilateral nasal passages corresponding with given history of polyposis.  S-shaped nasal septal deviation.

## 2024-04-26 NOTE — HISTORY OF PRESENT ILLNESS
[de-identified] : 84F here in second postoperative visit s/p ESS (maxillary, ethmoid, sphenoid, frontal) 3/20/24 for chronic pansinusitis with polyposis. Intraoperatively, severe polyposis over septum and expanding olfactory clefts, severe mucosal edema and osteitic bone throughout.  She is doing well since last seen. She has been using budesonide rinses BID. There is no rhinorrhea, pain or headache and she feels great. She had been admitted to OSH with HMPV which has since passed.  OR Cx: -rare MRSA -rare citrobacter  Pathology: pending  ROS otherwise unremarkable

## 2024-04-26 NOTE — PROCEDURE
[FreeTextEntry3] : Nasal Endoscopy: mild crusting and debris and scabbing removed nasal airways and paranasal sinuses widely patent mild edema throughout, suctioned no mucopus, no mucin small scab over bilateral anterior septu

## 2024-04-26 NOTE — ASSESSMENT
[FreeTextEntry1] : 84F here in second postoperative visit s/p ESS (maxillary, ethmoid, sphenoid, frontal) 3/20/24 for chronic pansinusitis with polyposis. Intraoperatively, severe polyposis over septum and expanding olfactory clefts, severe mucosal edema and osteitic bone throughout. She is doing well since last seen. She has been using budesonide rinses BID. There is no rhinorrhea, pain or headache and she feels great. She had been admitted to OSH with HMPV which has since passed. On exam, nasal endoscopy shows expected well healing postoperative changes with widely patent nasal airways and middle meati. Mild/moderate amounts of crusting was removed. She felt much better after debridement. She is doing very well s/p extensive ESS for pansinusitis and severe polyposis. There is much less debris and drainage on exam today - she finished course bactrim after recent visit. For now, continue budesonide rinses BID - will remain at 1mg BID. Gentle noseblowing. RTO 8 weeks in routine followup.

## 2024-05-30 NOTE — HISTORY OF PRESENT ILLNESS
[Never] : never [TextBox_4] : PMD Dr Anne Armijo at [facility]  11/28/2023: Asked to evaluate patient by Dr Krause for asthma. She just met Dr Krause 3 weeks ago and had significant polyp burden and was given prednisone and Bactrim w sig improvement. Asthma since age 13, hosp x 2, ED many times, prednisone many times. Was put on Dupixent in May 2022 from an allergist and had many problems including swelling, pneumonia, and uncontrolled asthma so she stopped Dupixent in Nov 2022. She has Flovent but doesn't really take it. She is taking albuterol infrequently, it sounds like, though it's a little hard to nail down a treatment history. She does apparently also self dose prednisone at times. She reports a general history of poorly tolerating asthma treatments without being able to provide detail about what she's tried. She is reluctant to consider other biologics because of her experience w Dupixent. She is intolerant of aspirin. She has a hx of SERENITY and is not on CPAP. Retired [employer] .  1/24/2024: Taking Breztri 1 puff twice a day and feels better. She cancelled her sinus surgery w Dr Krause because she feels like she's breathing better and sleeping better. Sleeping in her bed - previously was sleeping in a chair due to dyspnea. Still has anosmia. No exacerbations in the interim, no steroids. Waking w asthma sx maybe 5x since seeing me last. Still not using CPAP because she can't stand it. One nocturnal gasping/choking episode. Does blow clumps of blood from her nose. Reports pneumococcal vax x 2 after age 66. On lasix now from her primary w less cough and edema.  2/28/2024: Returns for clearance for sinus surgery. Sinus surgery was delayed due to hyperkalemia, and abnormal CXR. Here today w new CT chest. She is so symptomatic from her polyps she dosed herself with some prednisone. The polyps make it hard to sleep. Breathing is ok. Remains on the Breztri.  3/27/2024: Had her sinus surgery one week ago w Dr Krause, already sleeping better, went well. Here today w her daughter Mahnaz. Breathing seems better. On the Breztri.

## 2024-05-30 NOTE — HISTORY OF PRESENT ILLNESS
[Never] : never [TextBox_4] : PMD Dr Anne Armijo at [facility]  11/28/2023: Asked to evaluate patient by Dr Krause for asthma. She just met Dr Krasue 3 weeks ago and had significant polyp burden and was given prednisone and Bactrim w sig improvement. Asthma since age 13, hosp x 2, ED many times, prednisone many times. Was put on Dupixent in May 2022 from an allergist and had many problems including swelling, pneumonia, and uncontrolled asthma so she stopped Dupixent in Nov 2022. She has Flovent but doesn't really take it. She is taking albuterol infrequently, it sounds like, though it's a little hard to nail down a treatment history. She does apparently also self dose prednisone at times. She reports a general history of poorly tolerating asthma treatments without being able to provide detail about what she's tried. She is reluctant to consider other biologics because of her experience w Dupixent. She is intolerant of aspirin. She has a hx of SERENITY and is not on CPAP. Retired [employer] .  1/24/2024: Taking Breztri 1 puff twice a day and feels better. She cancelled her sinus surgery w Dr Krause because she feels like she's breathing better and sleeping better. Sleeping in her bed - previously was sleeping in a chair due to dyspnea. Still has anosmia. No exacerbations in the interim, no steroids. Waking w asthma sx maybe 5x since seeing me last. Still not using CPAP because she can't stand it. One nocturnal gasping/choking episode. Does blow clumps of blood from her nose. Reports pneumococcal vax x 2 after age 66. On lasix now from her primary w less cough and edema.  2/28/2024: Returns for clearance for sinus surgery. Sinus surgery was delayed due to hyperkalemia, and abnormal CXR. Here today w new CT chest. She is so symptomatic from her polyps she dosed herself with some prednisone. The polyps make it hard to sleep. Breathing is ok. Remains on the Breztri.  3/27/2024: Had her sinus surgery one week ago w Dr Krause, already sleeping better, went well. Here today w her daughter Mahnaz. Breathing seems better. On the Breztri.  4/8/2024: Follow up visit conducted by telehealth due to Covid pandemic. The patient gives consent for telehealth services, the patient and I are both in [facility], and the patient and I are the only participants on the call. Last week started to get more short of breath despite using Breztri. No sore throat, no fever. Some blood clots from the nose. Used Proventil, stopped Breztri and her nasal med thinking maybe these were causing the symptoms. She'd had a prescription for a 12 day steroid taper from Dr Krause post op on 3/20 and he wrote on 3/29 that she was taking it, though now today she cannot really remember if she took it at all, but I suspect she did take it and the end of the taper coincided roughy with the onset of her symptoms. One day last week she did take 10mg of prednisone because of her dyspnea. She's been using albuterol nebs including q6h yesterday.  4/25/2024: 2 weeks ago hospitalized at [place] [facility] for hMPV. Really just felt hot and sweaty, didn't have increased dyspnea or cough. However she was given prednisone. Now she is back on Breztri bid. Off prednisone. Feels fire. Vision a little cloudier. Has cataracts. Sees ophtho.

## 2024-05-30 NOTE — ASSESSMENT
[FreeTextEntry1] : Data reviewed:  Labs 11/2023: eos 470/ul, IgE 1853, RAST + oak, ragweed, devin grass, dog  PA/lat CXR [facility] 1/29/2024 report: new patchy bibasilar opacities CT chest [facility] 2/6/2024 personally reviewed : Mild bronchial wall thickening, bronchiectasis and bronchiolar impaction in the lingula and bilateral lower lobes.  PFT 11/28/2023 : mod fixed obstruction, FEV1 50-->58%, TLC 81%, DLCO 52% / FENO 38 [place] 1/24/2024: severe obstruction, FEV1 41% / FENO 38  Impression: Severe persistent asthma, with eosinophilia and IgE > 1000 Nasal polyposis SERENITY not on CPAP  Plan: Cont Breztri 2 puffs bid. Asthma is in chronic stable state and CT only shows some airways disease, nothing consistent with an acute infection. No pulmonary contraindication to surgery. It's extremely expensive for her to travel to [place]. She might benefit from transitioning care to [place]. I told her I will certainly facilitate her seeing Dr Olson post op if that helps her. See me after sinus surgery.

## 2024-05-30 NOTE — PHYSICAL EXAM
[Normal Rate/Rhythm] : normal rate/rhythm [Normal S1, S2] : normal s1, s2 [No Murmurs] : no murmurs [No Resp Distress] : no resp distress [Clear to Auscultation Bilaterally] : clear to auscultation bilaterally [TextBox_68] : no rales or wheezing

## 2024-05-30 NOTE — PHYSICAL EXAM
[No Acute Distress] : no acute distress [Normal Rate/Rhythm] : normal rate/rhythm [Normal S1, S2] : normal s1, s2 [No Murmurs] : no murmurs [TextBox_68] : coarse

## 2024-05-30 NOTE — CONSULT LETTER
[Dear  ___] : Dear  [unfilled], [Courtesy Letter:] : I had the pleasure of seeing your patient, [unfilled], in my office today. [Please see my note below.] : Please see my note below. [Consult Closing:] : Thank you very much for allowing me to participate in the care of this patient.  If you have any questions, please do not hesitate to contact me. [Sincerely,] : Sincerely, [FreeTextEntry2] : Anne Armijo MD [address] [address]  [FreeTextEntry3] : Silvia Becerra MD, [facility]

## 2024-05-30 NOTE — HISTORY OF PRESENT ILLNESS
[Never] : never [TextBox_4] : PMD Dr Anne Armijo at [facility]  11/28/2023: Asked to evaluate patient by Dr Krause for asthma. She just met Dr Krause 3 weeks ago and had significant polyp burden and was given prednisone and Bactrim w sig improvement. Asthma since age 13, hosp x 2, ED many times, prednisone many times. Was put on Dupixent in May 2022 from an allergist and had many problems including swelling, pneumonia, and uncontrolled asthma so she stopped Dupixent in Nov 2022. She has Flovent but doesn't really take it. She is taking albuterol infrequently, it sounds like, though it's a little hard to nail down a treatment history. She does apparently also self dose prednisone at times. She reports a general history of poorly tolerating asthma treatments without being able to provide detail about what she's tried. She is reluctant to consider other biologics because of her experience w Dupixent. She is intolerant of aspirin. She has a hx of SERENITY and is not on CPAP. Retired [employer] .   1/24/2024: Taking Breztri 1 puff twice a day and feels better. She cancelled her sinus surgery w Dr Krause because she feels like she's breathing better and sleeping better. Sleeping in her bed - previously was sleeping in a chair due to dyspnea. Still has anosmia. No exacerbations in the interim, no steroids. Waking w asthma sx maybe 5x since seeing me last. Still not using CPAP because she can't stand it. One nocturnal gasping/choking episode. Does blow clumps of blood from her nose. Reports pneumococcal vax x 2 after age 66. On lasix now from her primary w less cough and edema.

## 2024-05-30 NOTE — CONSULT LETTER
[Dear  ___] : Dear  [unfilled], [Courtesy Letter:] : I had the pleasure of seeing your patient, [unfilled], in my office today. [Please see my note below.] : Please see my note below. [Consult Closing:] : Thank you very much for allowing me to participate in the care of this patient.  If you have any questions, please do not hesitate to contact me. [Sincerely,] : Sincerely, [FreeTextEntry2] : Anne Armijo MD [address] [address]  [FreeTextEntry3] : Silvia Becerra MD, [facility]

## 2024-05-30 NOTE — HISTORY OF PRESENT ILLNESS
[Never] : never [TextBox_4] : PMD Dr Anne Armijo at [facility]  11/28/2023: Asked to evaluate patient by Dr Krause for asthma. She just met Dr Krause 3 weeks ago and had significant polyp burden and was given prednisone and Bactrim w sig improvement. Asthma since age 13, hosp x 2, ED many times, prednisone many times. Was put on Dupixent in May 2022 from an allergist and had many problems including swelling, pneumonia, and uncontrolled asthma so she stopped Dupixent in Nov 2022. She has Flovent but doesn't really take it. She is taking albuterol infrequently, it sounds like, though it's a little hard to nail down a treatment history. She does apparently also self dose prednisone at times. She reports a general history of poorly tolerating asthma treatments without being able to provide detail about what she's tried. She is reluctant to consider other biologics because of her experience w Dupixent. She is intolerant of aspirin. She has a hx of SERENITY and is not on CPAP. Retired [employer] .  1/24/2024: Taking Breztri 1 puff twice a day and feels better. She cancelled her sinus surgery w Dr Krause because she feels like she's breathing better and sleeping better. Sleeping in her bed - previously was sleeping in a chair due to dyspnea. Still has anosmia. No exacerbations in the interim, no steroids. Waking w asthma sx maybe 5x since seeing me last. Still not using CPAP because she can't stand it. One nocturnal gasping/choking episode. Does blow clumps of blood from her nose. Reports pneumococcal vax x 2 after age 66. On lasix now from her primary w less cough and edema.  2/28/2024: Returns for clearance for sinus surgery. Sinus surgery was delayed due to hyperkalemia, and abnormal CXR. Here today w new CT chest. She is so symptomatic from her polyps she dosed herself with some prednisone. The polyps make it hard to sleep. Breathing is ok. Remains on the Breztri.  3/27/2024: Had her sinus surgery one week ago w Dr Krause, already sleeping better, went well. Here today w her daughter Mahnaz. Breathing seems better. On the Breztri.  4/8/2024: Follow up visit conducted by telehealth due to Covid pandemic. The patient gives consent for telehealth services, the patient and I are both in [facility], and the patient and I are the only participants on the call. Last week started to get more short of breath despite using Breztri. No sore throat, no fever. Some blood clots from the nose. Used Proventil, stopped Breztri and her nasal med thinking maybe these were causing the symptoms. She'd had a prescription for a 12 day steroid taper from Dr Krause post op on 3/20 and he wrote on 3/29 that she was taking it, though now today she cannot really remember if she took it at all, but I suspect she did take it and the end of the taper coincided roughy with the onset of her symptoms. One day last week she did take 10mg of prednisone because of her dyspnea. She's been using albuterol nebs including q6h yesterday.

## 2024-05-30 NOTE — CONSULT LETTER
[Dear  ___] : Dear  [unfilled], [Courtesy Letter:] : I had the pleasure of seeing your patient, [unfilled], in my office today. [Please see my note below.] : Please see my note below. [Consult Closing:] : Thank you very much for allowing me to participate in the care of this patient.  If you have any questions, please do not hesitate to contact me. [Sincerely,] : Sincerely, [FreeTextEntry2] : Anne Armijo MD [address] [address]  [FreeTextEntry3] : Silvia Becerra MD, [facility]

## 2024-05-30 NOTE — HISTORY OF PRESENT ILLNESS
[Never] : never [TextBox_4] : PMD Dr Anne Armijo at [facility]  11/28/2023: Asked to evaluate patient by Dr Krause for asthma. She just met Dr Krause 3 weeks ago and had significant polyp burden and was given prednisone and Bactrim w sig improvement. Asthma since age 13, hosp x 2, ED many times, prednisone many times. Was put on Dupixent in May 2022 from an allergist and had many problems including swelling, pneumonia, and uncontrolled asthma so she stopped Dupixent in Nov 2022. She has Flovent but doesn't really take it. She is taking albuterol infrequently, it sounds like, though it's a little hard to nail down a treatment history. She does apparently also self dose prednisone at times. She reports a general history of poorly tolerating asthma treatments without being able to provide detail about what she's tried. She is reluctant to consider other biologics because of her experience w Dupixent. She is intolerant of aspirin. She has a hx of SERENITY and is not on CPAP. Retired [employer] .  1/24/2024: Taking Breztri 1 puff twice a day and feels better. She cancelled her sinus surgery w Dr Krause because she feels like she's breathing better and sleeping better. Sleeping in her bed - previously was sleeping in a chair due to dyspnea. Still has anosmia. No exacerbations in the interim, no steroids. Waking w asthma sx maybe 5x since seeing me last. Still not using CPAP because she can't stand it. One nocturnal gasping/choking episode. Does blow clumps of blood from her nose. Reports pneumococcal vax x 2 after age 66. On lasix now from her primary w less cough and edema.  2/28/2024: Returns for clearance for sinus surgery. Sinus surgery was delayed due to hyperkalemia, and abnormal CXR. Here today w new CT chest. She is so symptomatic from her polyps she dosed herself with some prednisone. The polyps make it hard to sleep. Breathing is ok. Remains on the Breztri.

## 2024-05-30 NOTE — ASSESSMENT
[FreeTextEntry1] : Data reviewed:  Labs 11/2023: eos 470/ul, IgE 1853, RAST + oak, ragweed, devin grass, dog Aspergillus Abs neg 1/2024  CT chest [facility] 2/6/2024 prev reviewed : Mild bronchial wall thickening, bronchiectasis and bronchiolar impaction in the lingula and bilateral lower lobes.  PFT 11/28/2023 : mod fixed obstruction, FEV1 50-->58%, TLC 81%, DLCO 52% / FENO 38 [place] 1/24/2024: severe obstruction, FEV1 41% / FENO 38  Impression: Severe persistent asthma, with eosinophilia and IgE > 1000, with exacerbation Nasal polyposis now s/p sinus surgery 3.2024 SERENITY not on CPAP  Plan: Does not feel she needs ED now and I think that's probably right. Discussed for any worsening of dyspnea certainly go to local ED. Prednisone 40mg x 7. Continue albuterol ATC for at least the next day. Go back on Breztri 2 puffs bid. [Reports having had two pneumococcal vaccines.]

## 2024-05-30 NOTE — CONSULT LETTER
[Dear  ___] : Dear  [unfilled], [Courtesy Letter:] : I had the pleasure of seeing your patient, [unfilled], in my office today. [Please see my note below.] : Please see my note below. [Consult Closing:] : Thank you very much for allowing me to participate in the care of this patient.  If you have any questions, please do not hesitate to contact me. [Sincerely,] : Sincerely, [FreeTextEntry2] : Anne Armijo MD [facility] [address] [place], [place] [address] [FreeTextEntry3] : Silvia Becerra MD, [facility]

## 2024-05-30 NOTE — ASSESSMENT
[FreeTextEntry1] : Data reviewed:  Labs 11/2023: eos 470/ul, IgE 1853, RAST + oak, ragweed, devin grass, dog  PFT 11/28/2023 : mod fixed obstruction, FEV1 50-->58%, TLC 81%, DLCO 52% / FENO 38 [place] 1/24/2024: severe obstructed, FEV1 41% / FENO 38  Impression: Severe persistent asthma, better controlled, with eosinophilia and IgE > 1000 Nasal polyposis SERENITY not on CPAP  Plan: Cont Breztri and dose at 2 puffs bid. Check aspergillus Abs, HRCT chest exclude central bronchiectasis characteristic of APBA. Would be good candidate for biologics, ongoing discussion. Suggest she see Dr Krause and review need for sinus surgery. Come back 2 mos. Had pneumococcal vaccine at [facility]. -- PA/lat CXR [facility] 1/29/2024 report: new patchy bibasilar opacities Get HRCT chest and follow up. Spoke to PMD Dr Anne Armijo at [facility]. Defer sinus surg.

## 2024-05-30 NOTE — CONSULT LETTER
[Dear  ___] : Dear  [unfilled], [Courtesy Letter:] : I had the pleasure of seeing your patient, [unfilled], in my office today. [Please see my note below.] : Please see my note below. [Consult Closing:] : Thank you very much for allowing me to participate in the care of this patient.  If you have any questions, please do not hesitate to contact me. [Sincerely,] : Sincerely, [FreeTextEntry2] : Anne Armijo MD [address] [address]  [FreeTextEntry3] : Silvia Becerra MD, [facility]

## 2024-05-30 NOTE — ASSESSMENT
[FreeTextEntry1] : Data reviewed:  Labs 11/2023: eos 470/ul, IgE 1853, RAST + oak, ragweed, devin grass, dog Aspergillus Abs neg 1/2024  CT chest [facility] 2/6/2024 prev reviewed : Mild bronchial wall thickening, bronchiectasis and bronchiolar impaction in the lingula and bilateral lower lobes.  PFT 11/28/2023 : mod fixed obstruction, FEV1 50-->58%, TLC 81%, DLCO 52% / FENO 38 [place] 1/24/2024: severe obstruction, FEV1 41% / FENO 38  Impression: Severe persistent asthma, with eosinophilia and IgE > 1000  - Hospitalized for hMPV 4/2024 [place] Nasal polyposis now s/p sinus surgery 3.2024 SERENITY not on CPAP  Plan: Actually doing great today. Stay on Breztri 2 puffs bid. See me July as planned, sooner for any problems. [Reports having had two pneumococcal vaccines.]

## 2024-05-30 NOTE — ASSESSMENT
[FreeTextEntry1] : Data reviewed:  Labs 11/2023: eos 470/ul, IgE 1853, RAST + oak, ragweed, devin grass, dog Aspergillus Abs neg 1/2024  CT chest [facility] 2/6/2024 personally reviewed : Mild bronchial wall thickening, bronchiectasis and bronchiolar impaction in the lingula and bilateral lower lobes.  PFT 11/28/2023 : mod fixed obstruction, FEV1 50-->58%, TLC 81%, DLCO 52% / FENO 38 [place] 1/24/2024: severe obstruction, FEV1 41% / FENO 38  Impression: Severe persistent asthma, with eosinophilia and IgE > 1000 Nasal polyposis now s/p sinus surgery 3.2024 SERENITY not on CPAP  Plan: Cont Breztri 2 puffs bid. Asthma is stable. Return 3-4 mos. Reports having had two pneumococcal vaccines. Discussed shingles and RSV vax. She will stay in my care for now but of course happy to txfr to Dr Olson at  if easier for pt.

## 2024-06-13 RX ORDER — BUDESONIDE 1 MG/2ML
1 INHALANT ORAL
Qty: 6 | Refills: 2 | Status: ACTIVE | COMMUNITY
Start: 2024-03-29 | End: 1900-01-01

## 2024-07-18 ENCOUNTER — EMERGENCY (EMERGENCY)
Facility: HOSPITAL | Age: 84
LOS: 1 days | Discharge: HOME | End: 2024-07-18
Attending: STUDENT IN AN ORGANIZED HEALTH CARE EDUCATION/TRAINING PROGRAM | Admitting: EMERGENCY MEDICINE
Payer: COMMERCIAL

## 2024-07-18 VITALS
DIASTOLIC BLOOD PRESSURE: 79 MMHG | HEART RATE: 75 BPM | RESPIRATION RATE: 17 BRPM | SYSTOLIC BLOOD PRESSURE: 176 MMHG | WEIGHT: 186.95 LBS | OXYGEN SATURATION: 97 % | HEIGHT: 64 IN | TEMPERATURE: 98 F

## 2024-07-18 DIAGNOSIS — Z98.890 OTHER SPECIFIED POSTPROCEDURAL STATES: Chronic | ICD-10-CM

## 2024-07-18 DIAGNOSIS — Z90.89 ACQUIRED ABSENCE OF OTHER ORGANS: Chronic | ICD-10-CM

## 2024-07-18 LAB
ALBUMIN SERPL ELPH-MCNC: 3.9 G/DL — SIGNIFICANT CHANGE UP (ref 3.3–5)
ALP SERPL-CCNC: 129 U/L — HIGH (ref 40–120)
ALT FLD-CCNC: 19 U/L — SIGNIFICANT CHANGE UP (ref 4–33)
ANION GAP SERPL CALC-SCNC: 11 MMOL/L — SIGNIFICANT CHANGE UP (ref 7–14)
AST SERPL-CCNC: 20 U/L — SIGNIFICANT CHANGE UP (ref 4–32)
BILIRUB SERPL-MCNC: 0.5 MG/DL — SIGNIFICANT CHANGE UP (ref 0.2–1.2)
BUN SERPL-MCNC: 19 MG/DL — SIGNIFICANT CHANGE UP (ref 7–23)
CALCIUM SERPL-MCNC: 9.6 MG/DL — SIGNIFICANT CHANGE UP (ref 8.4–10.5)
CHLORIDE SERPL-SCNC: 104 MMOL/L — SIGNIFICANT CHANGE UP (ref 98–107)
CO2 SERPL-SCNC: 26 MMOL/L — SIGNIFICANT CHANGE UP (ref 22–31)
CREAT SERPL-MCNC: 1.05 MG/DL — SIGNIFICANT CHANGE UP (ref 0.5–1.3)
EGFR: 53 ML/MIN/1.73M2 — LOW
GLUCOSE SERPL-MCNC: 89 MG/DL — SIGNIFICANT CHANGE UP (ref 70–99)
HCT VFR BLD CALC: 33.7 % — LOW (ref 34.5–45)
HGB BLD-MCNC: 11.8 G/DL — SIGNIFICANT CHANGE UP (ref 11.5–15.5)
MCHC RBC-ENTMCNC: 28.4 PG — SIGNIFICANT CHANGE UP (ref 27–34)
MCHC RBC-ENTMCNC: 35 GM/DL — SIGNIFICANT CHANGE UP (ref 32–36)
MCV RBC AUTO: 81.2 FL — SIGNIFICANT CHANGE UP (ref 80–100)
NRBC # BLD: 0 /100 WBCS — SIGNIFICANT CHANGE UP (ref 0–0)
NRBC # FLD: 0 K/UL — SIGNIFICANT CHANGE UP (ref 0–0)
PLATELET # BLD AUTO: 389 K/UL — SIGNIFICANT CHANGE UP (ref 150–400)
POTASSIUM SERPL-MCNC: 3.9 MMOL/L — SIGNIFICANT CHANGE UP (ref 3.5–5.3)
POTASSIUM SERPL-SCNC: 3.9 MMOL/L — SIGNIFICANT CHANGE UP (ref 3.5–5.3)
PROT SERPL-MCNC: 8.4 G/DL — HIGH (ref 6–8.3)
RBC # BLD: 4.15 M/UL — SIGNIFICANT CHANGE UP (ref 3.8–5.2)
RBC # FLD: 15 % — HIGH (ref 10.3–14.5)
SODIUM SERPL-SCNC: 141 MMOL/L — SIGNIFICANT CHANGE UP (ref 135–145)
WBC # BLD: 10.26 K/UL — SIGNIFICANT CHANGE UP (ref 3.8–10.5)
WBC # FLD AUTO: 10.26 K/UL — SIGNIFICANT CHANGE UP (ref 3.8–10.5)

## 2024-07-18 PROCEDURE — 99285 EMERGENCY DEPT VISIT HI MDM: CPT

## 2024-07-18 PROCEDURE — 70481 CT ORBIT/EAR/FOSSA W/DYE: CPT | Mod: 26,MC

## 2024-07-18 RX ORDER — VANCOMYCIN HYDROCHLORIDE 50 MG/ML
1000 KIT ORAL ONCE
Refills: 0 | Status: COMPLETED | OUTPATIENT
Start: 2024-07-18 | End: 2024-07-18

## 2024-07-18 RX ORDER — AMPICILLIN AND SULBACTAM 2; 1 G/20ML; G/20ML
3 INJECTION, POWDER, FOR SOLUTION INTRAMUSCULAR; INTRAVENOUS ONCE
Refills: 0 | Status: COMPLETED | OUTPATIENT
Start: 2024-07-18 | End: 2024-07-18

## 2024-07-18 RX ADMIN — VANCOMYCIN HYDROCHLORIDE 250 MILLIGRAM(S): KIT at 18:40

## 2024-07-18 RX ADMIN — AMPICILLIN AND SULBACTAM 200 GRAM(S): 2; 1 INJECTION, POWDER, FOR SOLUTION INTRAMUSCULAR; INTRAVENOUS at 18:03

## 2024-07-18 NOTE — ED PROVIDER NOTE - PROGRESS NOTE DETAILS
Visual acuity and Intraocular pressure assessed     Visual acuity: R: 20/40  L:20/50  IOP: R: 20  L: 19     Ronnell Linder, PGY1 Karmen Celeste M.D. (Resident Physician): consulted [email or web address]

## 2024-07-18 NOTE — ED PROVIDER NOTE - OBJECTIVE STATEMENT
84-year-old female with PMH of HTN, diabetes, asthma, breast cancer s/p lumpectomy, prior CVA with residual left-sided facial droop, c/o left eyelid x 2 days. Pt states she woke up with eyelid swelling and saw her ophthalmologist today who sent her to the ER with c/f preorbital cellulitis.    Denies any pain with eye movement, discharge, fever, chills, trauma, feeling, vision changes, photophobia, contact use, of foreign body, n/v/d, chest pain, SOB, abdominal pain.

## 2024-07-18 NOTE — ED ADULT TRIAGE NOTE - CHIEF COMPLAINT QUOTE
patient c/o L eye swelling and drainage x3 days. patient states she went to her eye doctor today who sent her here with concern for orbital cellulitis and wants a CT scan and IV antibiotics. past medical history of diabetes mellitus type 2, hypertension

## 2024-07-18 NOTE — ED PROVIDER NOTE - PHYSICAL EXAMINATION
Const: Awake, alert, no acute distress.  Well appearing.  Moving comfortably on stretcher.  HEENT: NC/AT.  Moist mucous membranes.  No pharyngeal erythema, no exudates.  Eyes: Extraocular movements intact b/l. No scleral icterus.  L conjunctiva injected. Diffuse L upper and lower eyelid swelling w/ mild ptosis.  Visual acuity: R: 20/40  L:20/50  IOP: R: 20  L: 19     Cardiac: Regular rate and regular rhythm. S1 S2 present.  Peripheral pulses 2+ and symmetric. No LE edema.  Resp: Speaking in full sentences. No evidence of respiratory distress.  Breath sounds clear to auscultation b/l. Normal chest excursion.   Abd: Non-distended, no overlying skin changes.  Soft, non-tender, no guarding, no rigidity, no rebound tenderness  Skin: Normal coloration.  No rashes, abrasions or lacerations.  Neuro: Awake, alert & oriented x 3.  Moves all extremities spontaneously.  No focal deficits.

## 2024-07-18 NOTE — ED PROVIDER NOTE - ATTENDING CONTRIBUTION TO CARE
I have personally performed a face to face medical and diagnostic evaluation of the patient. I have discussed with and reviewed the Resident's note and agree with the History, ROS, Physical Exam and MDM unless otherwise indicated. A brief summary of my personal evaluation and impression can be found below.    84-year-old female with a past medical history of breast cancer in remission bilateral cataracts, type 2 diabetes, hypertension, obstructive sleep apnea, asthma, prior CVA with some left-sided facial droop, presenting with concern of left eyelid swelling since Tuesday (2 days ago).  She denies any pain with moving her eye.  She went to see a Dr. Grossman today who recommended she evaluated in the emergency department for concern for periorbital cellulitis.  Patient denies any changes in her vision, fever, chills, vomiting, abdominal pain, chest pain, trouble breathing.  She has never experienced this prior.  She denies any allergies to antibiotics.    General: well-appearing, no acute distress  Head: Atraumatic, normocephalic  Eyes: EOM grossly in tact w/o pain, no scleral icterus, L eyelid edema and erythema with associated ptosis  ENT: moist mucous membranes  Neurology: A&Ox 3, nonfocal, REICH x 4  Respiratory: CTAB, no wheezing, normal respiratory effort  CV: RRR, good s1/s2, no S3, Extremities warm and well perfused  Abdominal: Soft, non-distended, non-tender, no masses  Extremities: No edema, no deformities  Skin: warm and dry. No rashes    Differential diagnosis includes but is not limited to periorbital cellulitis, orbital cellulitis, Allergy less likely given that it is unilateral, unlikely conjunctivitis given marked swelling of the lids and no pain in the eye, Get CT orbits to evaluate for orbital cellulitis, treat empirically with antibiotics, check labs.  If no orbital cellulitis would likely discharge on antibiotics.

## 2024-07-18 NOTE — ED PROVIDER NOTE - NSICDXPASTMEDICALHX_GEN_ALL_CORE_FT
PAST MEDICAL HISTORY:  Breast cancer     Cataract, bilateral     CVA (cerebral vascular accident)     Diabetes mellitus     HTN (hypertension)     SERENITY (obstructive sleep apnea)

## 2024-07-19 VITALS
OXYGEN SATURATION: 99 % | RESPIRATION RATE: 19 BRPM | SYSTOLIC BLOOD PRESSURE: 150 MMHG | DIASTOLIC BLOOD PRESSURE: 55 MMHG | TEMPERATURE: 97 F | HEART RATE: 65 BPM

## 2024-07-19 LAB
ADD ON TEST-SPECIMEN IN LAB: SIGNIFICANT CHANGE UP
ALBUMIN SERPL ELPH-MCNC: 3.7 G/DL — SIGNIFICANT CHANGE UP (ref 3.3–5)
ALP SERPL-CCNC: 122 U/L — HIGH (ref 40–120)
ALT FLD-CCNC: <5 U/L — SIGNIFICANT CHANGE UP (ref 4–33)
ANION GAP SERPL CALC-SCNC: 12 MMOL/L — SIGNIFICANT CHANGE UP (ref 7–14)
AST SERPL-CCNC: 54 U/L — HIGH (ref 4–32)
BASOPHILS # BLD AUTO: 0.07 K/UL — SIGNIFICANT CHANGE UP (ref 0–0.2)
BASOPHILS NFR BLD AUTO: 0.9 % — SIGNIFICANT CHANGE UP (ref 0–2)
BILIRUB SERPL-MCNC: 0.7 MG/DL — SIGNIFICANT CHANGE UP (ref 0.2–1.2)
BUN SERPL-MCNC: 17 MG/DL — SIGNIFICANT CHANGE UP (ref 7–23)
CALCIUM SERPL-MCNC: 9.2 MG/DL — SIGNIFICANT CHANGE UP (ref 8.4–10.5)
CHLORIDE SERPL-SCNC: 101 MMOL/L — SIGNIFICANT CHANGE UP (ref 98–107)
CO2 SERPL-SCNC: 22 MMOL/L — SIGNIFICANT CHANGE UP (ref 22–31)
CREAT SERPL-MCNC: 0.75 MG/DL — SIGNIFICANT CHANGE UP (ref 0.5–1.3)
EGFR: 79 ML/MIN/1.73M2 — SIGNIFICANT CHANGE UP
EOSINOPHIL # BLD AUTO: 1.25 K/UL — HIGH (ref 0–0.5)
EOSINOPHIL NFR BLD AUTO: 15.4 % — HIGH (ref 0–6)
GLUCOSE SERPL-MCNC: 117 MG/DL — HIGH (ref 70–99)
HCT VFR BLD CALC: 33.9 % — LOW (ref 34.5–45)
HGB BLD-MCNC: 11.6 G/DL — SIGNIFICANT CHANGE UP (ref 11.5–15.5)
IANC: 4.48 K/UL — SIGNIFICANT CHANGE UP (ref 1.8–7.4)
IMM GRANULOCYTES NFR BLD AUTO: 0.2 % — SIGNIFICANT CHANGE UP (ref 0–0.9)
LDH SERPL L TO P-CCNC: SIGNIFICANT CHANGE UP U/L (ref 135–225)
LYMPHOCYTES # BLD AUTO: 1.69 K/UL — SIGNIFICANT CHANGE UP (ref 1–3.3)
LYMPHOCYTES # BLD AUTO: 20.9 % — SIGNIFICANT CHANGE UP (ref 13–44)
MAGNESIUM SERPL-MCNC: 1.9 MG/DL — SIGNIFICANT CHANGE UP (ref 1.6–2.6)
MCHC RBC-ENTMCNC: 27.9 PG — SIGNIFICANT CHANGE UP (ref 27–34)
MCHC RBC-ENTMCNC: 34.2 GM/DL — SIGNIFICANT CHANGE UP (ref 32–36)
MCV RBC AUTO: 81.5 FL — SIGNIFICANT CHANGE UP (ref 80–100)
MONOCYTES # BLD AUTO: 0.59 K/UL — SIGNIFICANT CHANGE UP (ref 0–0.9)
MONOCYTES NFR BLD AUTO: 7.3 % — SIGNIFICANT CHANGE UP (ref 2–14)
NEUTROPHILS # BLD AUTO: 4.48 K/UL — SIGNIFICANT CHANGE UP (ref 1.8–7.4)
NEUTROPHILS NFR BLD AUTO: 55.3 % — SIGNIFICANT CHANGE UP (ref 43–77)
NRBC # BLD: 0 /100 WBCS — SIGNIFICANT CHANGE UP (ref 0–0)
NRBC # FLD: 0 K/UL — SIGNIFICANT CHANGE UP (ref 0–0)
PHOSPHATE SERPL-MCNC: SIGNIFICANT CHANGE UP MG/DL (ref 2.5–4.5)
PLATELET # BLD AUTO: 374 K/UL — SIGNIFICANT CHANGE UP (ref 150–400)
POTASSIUM SERPL-MCNC: SIGNIFICANT CHANGE UP MMOL/L (ref 3.5–5.3)
POTASSIUM SERPL-SCNC: SIGNIFICANT CHANGE UP MMOL/L (ref 3.5–5.3)
PROT SERPL-MCNC: SIGNIFICANT CHANGE UP G/DL (ref 6–8.3)
RBC # BLD: 4.16 M/UL — SIGNIFICANT CHANGE UP (ref 3.8–5.2)
RBC # FLD: 14.8 % — HIGH (ref 10.3–14.5)
SODIUM SERPL-SCNC: 135 MMOL/L — SIGNIFICANT CHANGE UP (ref 135–145)
T3 SERPL-MCNC: 98 NG/DL — SIGNIFICANT CHANGE UP (ref 80–200)
T4 AB SER-ACNC: 7.11 UG/DL — SIGNIFICANT CHANGE UP (ref 5.1–13)
TSH SERPL-MCNC: 0.84 UIU/ML — SIGNIFICANT CHANGE UP (ref 0.27–4.2)
WBC # BLD: 8.1 K/UL — SIGNIFICANT CHANGE UP (ref 3.8–10.5)
WBC # FLD AUTO: 8.1 K/UL — SIGNIFICANT CHANGE UP (ref 3.8–10.5)

## 2024-07-19 PROCEDURE — 84165 PROTEIN E-PHORESIS SERUM: CPT | Mod: 26

## 2024-07-19 PROCEDURE — 99236 HOSP IP/OBS SAME DATE HI 85: CPT

## 2024-07-19 RX ORDER — NEOMYCIN/POLYMYXIN B/DEXAMETHA 3.5-10K-.1
1 OINTMENT (GRAM) OPHTHALMIC (EYE)
Refills: 0 | Status: DISCONTINUED | OUTPATIENT
Start: 2024-07-19 | End: 2024-07-22

## 2024-07-19 RX ORDER — PIPERACILLIN SODIUM AND TAZOBACTAM SODIUM 3; .375 G/15ML; G/15ML
3.38 INJECTION, POWDER, LYOPHILIZED, FOR SOLUTION INTRAVENOUS EVERY 8 HOURS
Refills: 0 | Status: DISCONTINUED | OUTPATIENT
Start: 2024-07-19 | End: 2024-07-22

## 2024-07-19 RX ORDER — VANCOMYCIN HYDROCHLORIDE 50 MG/ML
1000 KIT ORAL EVERY 12 HOURS
Refills: 0 | Status: DISCONTINUED | OUTPATIENT
Start: 2024-07-19 | End: 2024-07-19

## 2024-07-19 RX ORDER — PIPERACILLIN SODIUM AND TAZOBACTAM SODIUM 3; .375 G/15ML; G/15ML
3.38 INJECTION, POWDER, LYOPHILIZED, FOR SOLUTION INTRAVENOUS ONCE
Refills: 0 | Status: COMPLETED | OUTPATIENT
Start: 2024-07-19 | End: 2024-07-19

## 2024-07-19 RX ORDER — AMOXICILLIN/POTASSIUM CLAV 250-125 MG
875 TABLET ORAL
Qty: 14 | Refills: 0
Start: 2024-07-19 | End: 2024-07-25

## 2024-07-19 RX ORDER — VANCOMYCIN HYDROCHLORIDE 50 MG/ML
1250 KIT ORAL EVERY 24 HOURS
Refills: 0 | Status: DISCONTINUED | OUTPATIENT
Start: 2024-07-19 | End: 2024-07-22

## 2024-07-19 RX ORDER — AMPICILLIN AND SULBACTAM 2; 1 G/20ML; G/20ML
3 INJECTION, POWDER, FOR SOLUTION INTRAMUSCULAR; INTRAVENOUS ONCE
Refills: 0 | Status: COMPLETED | OUTPATIENT
Start: 2024-07-19 | End: 2024-07-19

## 2024-07-19 RX ORDER — POLYVINYL ALCOHOL, POVIDONE .5; .6 G/100ML; G/100ML
2 SOLUTION OPHTHALMIC
Refills: 0 | Status: DISCONTINUED | OUTPATIENT
Start: 2024-07-19 | End: 2024-07-22

## 2024-07-19 RX ADMIN — POLYVINYL ALCOHOL, POVIDONE 2 DROP(S): .5; .6 SOLUTION OPHTHALMIC at 12:32

## 2024-07-19 RX ADMIN — PIPERACILLIN SODIUM AND TAZOBACTAM SODIUM 200 GRAM(S): 3; .375 INJECTION, POWDER, LYOPHILIZED, FOR SOLUTION INTRAVENOUS at 04:19

## 2024-07-19 RX ADMIN — AMPICILLIN AND SULBACTAM 200 GRAM(S): 2; 1 INJECTION, POWDER, FOR SOLUTION INTRAMUSCULAR; INTRAVENOUS at 03:02

## 2024-07-19 RX ADMIN — PIPERACILLIN SODIUM AND TAZOBACTAM SODIUM 25 GRAM(S): 3; .375 INJECTION, POWDER, LYOPHILIZED, FOR SOLUTION INTRAVENOUS at 08:40

## 2024-07-19 RX ADMIN — Medication 1 APPLICATION(S): at 12:32

## 2024-07-19 NOTE — ED CDU PROVIDER INITIAL DAY NOTE - OBJECTIVE STATEMENT
84-year-old female with PMH of HTN, diabetes, asthma, breast cancer s/p lumpectomy, prior CVA with residual left-sided facial droop, c/o left eyelid x 2 days. Pt states she woke up with eyelid swelling and saw her ophthalmologist today who sent her to the ER with c/f preorbital cellulitis.  Denies any pain with eye movement, discharge, fever, chills, trauma, feeling, vision changes, photophobia, contact use, of foreign body, n/v/d, chest pain, SOB, abdominal pain.    CDU EJ Brewster Note----  ED Provider HPI as above, reviewed/agreed to.  In the ED, VSS, WBC 10.26, A1c 6.6, CMP with no emergent/actionable findings.  CT orbits was performed: "...IMPRESSION: Left periorbital cellulitis with left dacryoadenitis. Inflammatory changes surrounding the left lacrimal gland extend posteriorly into the intraorbital/extraconal spaces, possibility of post septal extension/inflammation cannot be excluded. There is no abscess identified.".  Pt was started on Vanco and Unasyn; Ophtho was consulted and recommendations appreciated; IV antibiotics choice deferred to ED.  Per ED attending, advised IV Vancomycin + IV Zosyn; pt was sent to CDU for continued care / monitoring.

## 2024-07-19 NOTE — ED CDU PROVIDER DISPOSITION NOTE - NSFOLLOWUPINSTRUCTIONS_ED_ALL_ED_FT
take augmentin 875mg 2x/day for 7 days.  Apply Maxitrol ointment given to left eye four times a day.  Warm compresses to left eye.  Follow up with your ophthalmologist next week.  Return to Ed for any worsening swelling, pain, fever, visual changes.

## 2024-07-19 NOTE — ED ADULT NURSE REASSESSMENT NOTE - NS ED NURSE REASSESS COMMENT FT1
Break RN: patient resting comfortably in stretcher, breathing even and unlabored. Offers no complaints at this time. Instructed to call for assistance. Stretcher in lowest position, wheels locked, appropriate side rails in place, call bell in reach. Pending dispo
Pt allowed to eat as per ENT.
report received from madina Martins. Pt is A&Ox4, appears comfortable in stretcher. Pt offers no complaints or requests at this time. breathing is even and unlabored. Stretcher set in lowest position, call bell within reach, safety maintained.
Pt currently sleeping, no acute distress noted.
Pt received sitting up on stretcher, A&Ox4, no labored breathing, swelling noted to right eye, Optho at bedside with patient.
report received from Ramona MCGARRY. Pt is A&Ox3, appears comfortable in stretcher. Pt offers no complaints or requests at this time. breathing is even and unlabored. +left eyelid swelling. awaiting eyedrops from pharmacy. pending CDU bed assignment. Stretcher set in lowest position, call bell within reach, safety maintained.

## 2024-07-19 NOTE — ED CDU PROVIDER DISPOSITION NOTE - CLINICAL COURSE
84-year-old female with PMH of HTN, diabetes, asthma, breast cancer s/p lumpectomy, prior CVA with residual left-sided facial droop, c/o left eyelid x 2 days. Pt states she woke up with eyelid swelling and saw her ophthalmologist today who sent her to the ER with c/f preorbital cellulitis.  	Denies any pain with eye movement, discharge, fever, chills, trauma, feeling, vision changes, photophobia, contact use, of foreign body, n/v/d, chest pain, SOB, abdominal pain.  n the ED, VSS, WBC 10.26, A1c 6.6, CMP with no emergent/actionable findings.  CT orbits was performed: "...IMPRESSION: Left periorbital cellulitis with left dacryoadenitis. Inflammatory changes surrounding the left lacrimal gland extend posteriorly into the intraorbital/extraconal spaces, possibility of post septal extension/inflammation cannot be excluded. There is no abscess identified.".  Pt was started on Vanco and Unasyn; Ophtho was consulted and recommendations appreciated; IV antibiotics choice deferred to ED.    Pt symptoms improved, is able to open eye now.  swelling improved.  re-evaluated by optho and cleared for discharge.

## 2024-07-19 NOTE — ED CDU PROVIDER INITIAL DAY NOTE - CLINICAL SUMMARY MEDICAL DECISION MAKING FREE TEXT BOX
IV Vanco/Zosyn, supportive care, recommendations per Ophtho team following pt, AM labs, general observation care / monitoring.

## 2024-07-19 NOTE — ED CDU PROVIDER INITIAL DAY NOTE - PHYSICAL EXAMINATION
CONSTITUTIONAL:  Well appearing, awake, alert, oriented to person, place, time/situation and in no apparent distress.  Pt. is objectively comfortable appearing and verbalizing in full, clear, effortless sentences.  ENMT: OS periorbital erythema and soft tissue swelling.  Airway patent.  Nasal mucosa clear.  Moist mucous membranes.  Neck supple.  EYES:  Clear OU.  CARDIAC:  Normal rate, regular rhythm.  Heart sounds S1 S2.  No murmurs, gallops, or rubs.  RESPIRATORY:  Breath sounds clear and equal bilaterally.  No wheezes, no rales, no rhonchi.  GASTROINTESTINAL:  Abdomen soft, non-distended, non-tender.  No rebound, no guarding.  NEUROLOGICAL:  Alert and oriented to person/place/time/situation.  No focal deficits.  MUSCULOSKELETAL:  Range of motion is not limited.     SKIN:  Skin warm, dry.  PSYCHIATRIC:  Alert and oriented to person/place/time/situation.  Mood and affect WNL.  No apparent risk to self or others.

## 2024-07-19 NOTE — ED CDU PROVIDER DISPOSITION NOTE - ATTENDING APP SHARED VISIT CONTRIBUTION OF CARE
Gabriela Loco MD attending physician patient placed in CDU for observation of effective IV antibiotics and reevaluation by Optho.  Patient with improving swelling and feels much improved.  Opto came to evaluate patient feel that they are improving adequately and can follow-up as an outpatient ophthalmologist.  Also attending also involved.    Patient awake alert and comfortable has good eye motion.  Being discharged with p.o. antibiotics and to follow-up with outpatient    I performed a history and physical exam of the patient and discussed their management with the resident and /or advanced care provider. I personally made/approved the management plan and take responsibility for the patient management. I reviewed the resident and /or ACP's note and agree with the documented findings and plan of care. My medical decison making and observations are found above.

## 2024-07-19 NOTE — CONSULT NOTE ADULT - ASSESSMENT
Assessment and Recommendations:    HPI:    84y female with a past medical history/ocular history of HTN, diabetes, asthma, breast cancer s/p lumpectomy, prior CVA with residual left-sided facial droop consulted for eyelid swelling of the left eye, found to have dacryoadenitis with preseptal cellulitis.     #dacryoadenitis /w preseptal cellulitis, left side  - BCVA 20/20- OD, 20/50 OS   - IOP 21 OD, 21 OS   - PEERLA without RAPD OU   - Confrontational VF full OU   - EOMI OU without diplopia, pain with movement  - anterior exam is no proptosis, eyeball moves without restriction, no resistance to retropulsion, DANILO and LLL erythema and edema, injected 2+ OS with watery discharge OS, chemosis nasal >temporal, NS 2+, CS 1-2 OD, CS 2+OS, temporal LPIs  - DFE with sharp optic nerves 0.3 CDR OU, PPA flat macula OU, DBH/MAs OD in 4 quadrants, OS in 2 quadrants, AV nicking OU   - Afebrile, no leukocytosis  - Posterior segment exam with DR findings of DBH/ MA OD>OS  - CT head orbits w/wo left periorbital cellulitis with left dacryoadenitis, no abscess seen   - IV antibiotics per primary team, pt is now s/p 1 dose of vanc/zosyn  - Cold compress QID  - Recommend labs for TSH, T3, T4, IgG/IgG4 ab, ACE, TJ, cANCA, pANCA, SPEP, LDH, CBC with diff, quant gold  - Recommend artificial tears drops QID to the left eye  - Recommend observation for 24 hours to ensure improvement on abx   - Findings and plan discussed with patient and primary team.    #Diabetic retinopathy OD>OS  #HTN Retinopathy OU  - DFE with DBH/MAs OD in 4 quadrants, OS in 2 quadrants; AV nicking OU   - Pt follows with a retina specialist q6mo  - Never received injections   - c/w outpatient care       Discussed with Dr. Sylvester.    Outpatient Follow-up: Patient should follow-up with his/her ophthalmologist or with [facility] Department of Ophthalmology within 1 week of after discharge at:    [address]  [place] [address]  [phone]    Delia Stapleton MD, PGY-2  Also available on Microsoft Teams     Assessment and Recommendations:    HPI:    84y female with a past medical history/ocular history of HTN, diabetes, asthma, breast cancer s/p lumpectomy, prior CVA with residual left-sided facial droop consulted for eyelid swelling of the left eye, found to have dacryoadenitis with preseptal cellulitis.     #dacryoadenitis /w preseptal cellulitis, left side  - BCVA 20/20- OD, 20/50 OS   - IOP 21 OD, 21 OS   - PEERLA without RAPD OU   - Confrontational VF full OU   - Color plates 12/12 OU   - EOMI OU without diplopia, pain with movement  - anterior exam is no proptosis, eyeball moves without restriction, no resistance to retropulsion, DANILO and LLL erythema and edema, injected 2+ OS with watery discharge OS, chemosis nasal >temporal, NS 2+, CS 1-2 OD, CS 2+OS, temporal LPIs  - DFE with sharp optic nerves 0.3 CDR OU, PPA flat macula OU, DBH/MAs OD in 4 quadrants, OS in 2 quadrants, AV nicking OU, drusen in periphery    - Afebrile, no leukocytosis  - Posterior segment exam with DR findings of DBH/ MA OD>OS  - CT head orbits w/wo left periorbital cellulitis with left dacryoadenitis, no abscess seen   - IV antibiotics per primary team, pt is now s/p 1 dose of vanc/zosyn  - Cold compress QID  - Recommend labs for TSH, T3, T4, IgG/IgG4 ab, ACE, TJ, cANCA, pANCA, SPEP, LDH, CBC with diff, quant gold  - Recommend artificial tears drops QID to the left eye  - Recommend maxitrol ointment QID to the left eye  - Recommend observation for 24 hours to ensure improvement on abx   - Findings and plan discussed with patient and primary team.    #Diabetic retinopathy OD>OS  #HTN Retinopathy OU  - DFE with DBH/MAs OD in 4 quadrants, OS in 2 quadrants; AV nicking OU   - Pt follows with a retina specialist q6mo  - Never received injections   - c/w outpatient care       Discussed with Dr. Sylvester.    Outpatient Follow-up: Patient should follow-up with his/her ophthalmologist or with [facility] Department of Ophthalmology within 1 week of after discharge at:    [address]  [place] [address]  [phone]    Delia Stapleton MD, PGY-2  Also available on Microsoft Teams     Assessment and Recommendations:    HPI:    84y female with a past medical history/ocular history of HTN, diabetes, asthma, breast cancer s/p lumpectomy, prior CVA with residual left-sided facial droop consulted for eyelid swelling of the left eye, found to have dacryoadenitis with preseptal cellulitis.     #dacryoadenitis /w preseptal cellulitis, left side  - BCVA 20/20- OD, 20/50 OS   - IOP 21 OD, 21 OS   - PEERLA without RAPD OU   - Confrontational VF full OU   - Color plates 12/12 OU   - EOMI OU without diplopia, pain with movement  - anterior exam is no proptosis, eyeball moves without restriction, no resistance to retropulsion, DANILO and LLL erythema and edema, injected 2+ OS with watery discharge OS, chemosis nasal >temporal, NS 2+, CS 1-2 OD, CS 2+OS, temporal LPIs  - DFE with sharp optic nerves 0.3 CDR OU, PPA flat macula OU, DBH/MAs OD in 4 quadrants, OS in 2 quadrants, AV nicking OU, drusen in periphery    - Afebrile, no leukocytosis  - Posterior segment exam with DR findings of DBH/ MA OD>OS  - CT head orbits w/wo left periorbital cellulitis with left dacryoadenitis, no abscess seen   - IV antibiotics per primary team, pt is now s/p 1 dose of vanc/zosyn  - Cold compress QID  - Recommend labs for TSH, T3, T4, IgG/IgG4 ab, ACE, TJ, cANCA, pANCA, SPEP, LDH, CBC with diff, quant gold  - Recommend artificial tears drops QID to the left eye  - Recommend maxitrol ointment QID to the left eye  - Recommend observation for 24 hours to ensure improvement on abx   - On reassessment, pt with significant improvement of eyelid swelling with IV abx, pt cleared for DC with PO abx and ophthalmology follow up, will arrange.     #Diabetic retinopathy OD>OS  #HTN Retinopathy OU  - DFE with DBH/MAs OD in 4 quadrants, OS in 2 quadrants; AV nicking OU   - Pt follows with a retina specialist q6mo  - Never received injections   - c/w outpatient care       Discussed with Dr. Sylvester.    Outpatient Follow-up: Patient should follow-up with his/her ophthalmologist or with [facility] Department of Ophthalmology within 1 week of after discharge at:    [address]  [place] [address]  [phone]    Delia Stapleton MD, PGY-2  Also available on Microsoft Teams

## 2024-07-19 NOTE — ED CDU PROVIDER INITIAL DAY NOTE - ATTENDING APP SHARED VISIT CONTRIBUTION OF CARE
CDU MD HICKS:  I performed a face to face bedside interview with patient regarding history of present illness, review of symptoms and past medical history. I completed an independent physical exam.  I have discussed patient's plan of care with PA.   I agree with note as stated above, having amended the EMR as needed to reflect my findings. I have discussed the assessment and plan of care.  This includes during the time I functioned as the attending physician for this patient.    84-year-old female with periorbital cellulitis sent to CDU for IV antibiotics, monitoring for improvement.  Ophthalmology following.
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
## 2024-07-19 NOTE — ED CDU PROVIDER DISPOSITION NOTE - PATIENT PORTAL LINK FT
You can access the FollowMyHealth Patient Portal offered by [facility] by registering at the following website: [email or web address]. By joining [facility]’s FollowMyHealth portal, you will also be able to view your health information using other applications (apps) compatible with our system.

## 2024-07-22 ENCOUNTER — APPOINTMENT (OUTPATIENT)
Dept: PULMONOLOGY | Facility: CLINIC | Age: 84
End: 2024-07-22
Payer: MEDICARE

## 2024-07-22 VITALS
DIASTOLIC BLOOD PRESSURE: 74 MMHG | HEIGHT: 65 IN | OXYGEN SATURATION: 96 % | WEIGHT: 187 LBS | TEMPERATURE: 97.4 F | SYSTOLIC BLOOD PRESSURE: 144 MMHG | HEART RATE: 100 BPM | BODY MASS INDEX: 31.16 KG/M2

## 2024-07-22 DIAGNOSIS — J45.50 SEVERE PERSISTENT ASTHMA, UNCOMPLICATED: ICD-10-CM

## 2024-07-22 LAB
AUTO DIFF PNL BLD: ABNORMAL
C-ANCA SER-ACNC: NEGATIVE — SIGNIFICANT CHANGE UP
GAMMA INTERFERON BACKGROUND BLD IA-ACNC: 0.02 IU/ML — SIGNIFICANT CHANGE UP
IGG FLD-MCNC: 2101 MG/DL — HIGH (ref 610–1660)
IGG1 SER-MCNC: 1272 MG/DL — HIGH (ref 240–1118)
IGG2 SER-MCNC: 551 MG/DL — HIGH (ref 124–549)
IGG3 SER-MCNC: 136.6 MG/DL — HIGH (ref 15–102)
IGG4 SER-MCNC: 241 MG/DL — HIGH (ref 1–123)
M TB IFN-G BLD-IMP: NEGATIVE — SIGNIFICANT CHANGE UP
M TB IFN-G CD4+ BCKGRND COR BLD-ACNC: 0.02 IU/ML — SIGNIFICANT CHANGE UP
M TB IFN-G CD4+CD8+ BCKGRND COR BLD-ACNC: 0.07 IU/ML — SIGNIFICANT CHANGE UP
P-ANCA SER-ACNC: NEGATIVE — SIGNIFICANT CHANGE UP
QUANT TB PLUS MITOGEN MINUS NIL: 5.29 IU/ML — SIGNIFICANT CHANGE UP

## 2024-07-22 PROCEDURE — 99213 OFFICE O/P EST LOW 20 MIN: CPT

## 2024-07-22 PROCEDURE — G2211 COMPLEX E/M VISIT ADD ON: CPT

## 2024-07-22 NOTE — HISTORY OF PRESENT ILLNESS
[Never] : never [TextBox_4] : PMD Dr Anne Armijo at [facility]  11/28/2023: Asked to evaluate patient by Dr Krause for asthma. She just met Dr rKause 3 weeks ago and had significant polyp burden and was given prednisone and Bactrim w sig improvement. Asthma since age 13, hosp x 2, ED many times, prednisone many times. Was put on Dupixent in May 2022 from an allergist and had many problems including swelling, pneumonia, and uncontrolled asthma so she stopped Dupixent in Nov 2022. She has Flovent but doesn't really take it. She is taking albuterol infrequently, it sounds like, though it's a little hard to nail down a treatment history. She does apparently also self dose prednisone at times. She reports a general history of poorly tolerating asthma treatments without being able to provide detail about what she's tried. She is reluctant to consider other biologics because of her experience w Dupixent. She is intolerant of aspirin. She has a hx of SERENITY and is not on CPAP. Retired [employer] .  1/24/2024: Taking Breztri 1 puff twice a day and feels better. She cancelled her sinus surgery w Dr Krause because she feels like she's breathing better and sleeping better. Sleeping in her bed - previously was sleeping in a chair due to dyspnea. Still has anosmia. No exacerbations in the interim, no steroids. Waking w asthma sx maybe 5x since seeing me last. Still not using CPAP because she can't stand it. One nocturnal gasping/choking episode. Does blow clumps of blood from her nose. Reports pneumococcal vax x 2 after age 66. On lasix now from her primary w less cough and edema.  2/28/2024: Returns for clearance for sinus surgery. Sinus surgery was delayed due to hyperkalemia, and abnormal CXR. Here today w new CT chest. She is so symptomatic from her polyps she dosed herself with some prednisone. The polyps make it hard to sleep. Breathing is ok. Remains on the Breztri.  3/27/2024: Had her sinus surgery one week ago w Dr Krause, already sleeping better, went well. Here today w her daughter Mahnaz. Breathing seems better. On the Breztri.  4/8/2024: Follow up visit conducted by telehealth due to Covid pandemic. The patient gives consent for telehealth services, the patient and I are both in [facility], and the patient and I are the only participants on the call. Last week started to get more short of breath despite using Breztri. No sore throat, no fever. Some blood clots from the nose. Used Proventil, stopped Breztri and her nasal med thinking maybe these were causing the symptoms. She'd had a prescription for a 12 day steroid taper from Dr Krause post op on 3/20 and he wrote on 3/29 that she was taking it, though now today she cannot really remember if she took it at all, but I suspect she did take it and the end of the taper coincided roughy with the onset of her symptoms. One day last week she did take 10mg of prednisone because of her dyspnea. She's been using albuterol nebs including q6h yesterday.  4/25/2024: 2 weeks ago hospitalized at [facility] for hMPV. Really just felt hot and sweaty, didn't have increased dyspnea or cough. However she was given prednisone. Now she is back on Breztri bid. Off prednisone. Feels fire. Vision a little cloudier. Has cataracts. Sees ophtho.  7/22/2024: Was just at [facility] for a preorbital cellulitis, had IV Abx but not admitted, is on Abx and improving. Breathing has been fine, on Breztri bid and no interval exacerbations. Feels good, not wheezing or coughing. Daughter brought her here today. Sinuses ok.

## 2024-07-22 NOTE — CONSULT LETTER
[Dear  ___] : Dear  [unfilled], [Courtesy Letter:] : I had the pleasure of seeing your patient, [unfilled], in my office today. [Please see my note below.] : Please see my note below. [Consult Closing:] : Thank you very much for allowing me to participate in the care of this patient.  If you have any questions, please do not hesitate to contact me. [Sincerely,] : Sincerely, [FreeTextEntry2] : Anne Armijo MD [address] [address]  [FreeTextEntry3] : Silvia eBcerra MD, [facility]

## 2024-07-22 NOTE — ASSESSMENT
[FreeTextEntry1] : Data reviewed:  Labs 11/2023: eos 470/ul, IgE 1853, RAST + oak, ragweed, devin grass, dog Aspergillus Abs neg 1/2024  CT chest [facility] 2/6/2024 prev reviewed : Mild bronchial wall thickening, bronchiectasis and bronchiolar impaction in the lingula and bilateral lower lobes.  PFT 11/28/2023 : mod fixed obstruction, FEV1 50-->58%, TLC 81%, DLCO 52% / FENO 38 [place] 1/24/2024: severe obstruction, FEV1 41% / FENO 38  Impression: Severe persistent asthma, with eosinophilia and IgE > 1000  - Hospitalized for hMPV 4/2024 [place] Nasal polyposis now s/p sinus surgery 3/2024 SERENITY not on CPAP  Plan: Doing very well. Stay on Breztri 2 puffs bid. See me 6 mos. Sooner for problems.

## 2024-07-23 LAB
ANA PAT FLD IF-IMP: ABNORMAL
ANA TITR SER: ABNORMAL

## 2024-07-24 NOTE — ED POST DISCHARGE NOTE - DETAILS
EJ Viera: Pt called, confirmed she has her results and needs to follow up with her PMD, has optho appointment today, informed patient at [facility] test was unable to be performed, can have it done with her PMD

## 2024-08-09 ENCOUNTER — APPOINTMENT (OUTPATIENT)
Dept: OTOLARYNGOLOGY | Facility: CLINIC | Age: 84
End: 2024-08-09

## 2024-08-09 PROBLEM — H61.23 BILATERAL IMPACTED CERUMEN: Status: ACTIVE | Noted: 2024-08-09

## 2024-08-09 PROCEDURE — 31231 NASAL ENDOSCOPY DX: CPT

## 2024-08-09 PROCEDURE — 99213 OFFICE O/P EST LOW 20 MIN: CPT | Mod: 25

## 2024-08-09 PROCEDURE — 69210 REMOVE IMPACTED EAR WAX UNI: CPT

## 2024-08-09 NOTE — HISTORY OF PRESENT ILLNESS
MRI ADULT DISCHARGE INSTRUCTIONS    You have been medicated today for your scan. Please follow the instructions below to ensure your safe recovery. If you have any questions or problems, feel free to call us at [phone] or [phone].     1.   Have someone stay with you to assist you as needed.    2.   Do not drive or operate any mechanical devices.    3.   Do not perform any activity that requires concentration. Make no major decisions over the next 24 hours.     4.   Be careful changing positions from laying down to sitting or standing, as you may become dizzy.     5.   Do not drink alcohol for 48 hours.    6.   There are no restrictions for eating your normal meals. Drink fluids.    7.   You may continue your usual medications for pain, tranquilizers, muscle relaxants or sedatives when awake.     8.   Tomorrow, you may continue your normal daily activities.     9.   Pressure dressing on 10 - 15 minutes. If swelling or bleeding occurs when removed, continue placing direct pressure on injection site for another 5 minutes, or until bleeding stops.     I have been informed of and understand the above discharge instructions.   
[de-identified] : 84F here in postoperative visit s/p ESS (maxillary, ethmoid, sphenoid, frontal) 3/20/24 for chronic pansinusitis with polyposis. Intraoperatively, severe polyposis over septum and expanding olfactory clefts, severe mucosal edema and osteitic bone throughout.  She is doing well since last seen. She has been using budesonide rinses BID. There is no rhinorrhea, pain or headache and she feels great. Last month she was admitted with left orbital celluitis and finished IV abx; this was not due to sinusitis and I reviewed imaging at that time which showed expected postoperative changes and intact orbit.  ROS otherwise unremarkable

## 2024-08-09 NOTE — ASSESSMENT
[FreeTextEntry1] : 84F here in postoperative visit s/p ESS (maxillary, ethmoid, sphenoid, frontal) 3/20/24 for chronic pansinusitis with polyposis. Intraoperatively, severe polyposis over septum and expanding olfactory clefts, severe mucosal edema and osteitic bone throughout. She is doing well since last seen. She has been using budesonide rinses BID. There is no rhinorrhea, pain or headache and she feels great. Last month she was admitted with left orbital cellulitis and finished IV abx; this was not due to sinusitis and I reviewed imaging at that time which showed expected postoperative changes and intact orbit. On exam, nasal endoscopy shows expected well healed postoperative changes with widely patent nasal airways and paranasal sinuses as above with no mucopus or crusting or mucin. Cerumen was removed from both ears. She is doing very well s/p extensive ESS for pansinusitis and severe polyposis. There is no debris or drainage on exam today. For now, continue budesonide rinses BID. Normal activity. Cont pulm and ophtho f/u. RTO 4 months.

## 2024-08-09 NOTE — DATA REVIEWED
[de-identified] : CT Sinus 11/18/23: FINDINGS: Sinocranial & sinoorbital junctions: The lamina papyracea, cribriform plates and fovea ethmoidalis are intact.  Nasal septum and nasal cavity: Nasal prosthesis is noted overlying the nasal bones/cartilage. S-shaped nasal septal deviation. Soft tissue opacification throughout the bilateral nasal passages corresponding with history of polyposis.  Frontal sinuses, drainage pathways, and associated anatomic variants: Near-complete opacification of the frontal sinuses with chronic wall thickening. Opacified bilateral frontal outflow tracts.  Ethmoid sinuses: Near complete opacification bilaterally.  Sphenoid sinuses, drainage pathways, and associated variants: Mild right and severe left sphenoid sinus opacification. Opacification of the bilateral sphenoethmoidal recesses. The carotid canals are covered by bone.  Maxillary sinuses, drainage pathways, and associated variants: Complete opacification of the bilateral maxillary sinuses and ostiomeatal units. Central mild hyperdensity could represent inspissated secretions and/or fungal overgrowth.  Other: Partially visualized intracranial structures: Normal Orbits: Normal Mastoid air cells: Trace left mastoid effusion.   IMPRESSION: Severe inflammatory changes throughout the paranasal sinuses and their respective drainage pathways.  Soft tissue opacification in the bilateral nasal passages corresponding with given history of polyposis.  S-shaped nasal septal deviation.

## 2024-08-09 NOTE — PHYSICAL EXAM
[Nasal Endoscopy Performed] : nasal endoscopy was performed, see procedure section for findings [Midline] : trachea located in midline position [Normal] : no rashes [de-identified] : EAC w cerumen removed w suction/curet, TM intact, ME clear

## 2024-08-09 NOTE — PROCEDURE
[FreeTextEntry3] : Nasal Endoscopy: nasal airways widely patent small anterior septal perforation maxillaries, sphenoids and ethmoids widely patent no mucopus, no mucin choana clear

## 2024-11-01 ENCOUNTER — NON-APPOINTMENT (OUTPATIENT)
Age: 84
End: 2024-11-01

## 2024-11-01 ENCOUNTER — APPOINTMENT (OUTPATIENT)
Dept: OTOLARYNGOLOGY | Facility: CLINIC | Age: 84
End: 2024-11-01
Payer: MEDICARE

## 2024-11-01 DIAGNOSIS — H90.8 MIXED CONDUCTIVE AND SENSORINEURAL HEARING LOSS, UNSPECIFIED: ICD-10-CM

## 2024-11-01 DIAGNOSIS — J33.9 NASAL POLYP, UNSPECIFIED: ICD-10-CM

## 2024-11-01 DIAGNOSIS — J32.4 CHRONIC PANSINUSITIS: ICD-10-CM

## 2024-11-01 PROCEDURE — 31231 NASAL ENDOSCOPY DX: CPT | Mod: 58

## 2024-11-01 PROCEDURE — 99024 POSTOP FOLLOW-UP VISIT: CPT

## 2024-11-01 PROCEDURE — 92557 COMPREHENSIVE HEARING TEST: CPT

## 2024-11-01 PROCEDURE — 92550 TYMPANOMETRY & REFLEX THRESH: CPT | Mod: 52

## 2024-11-18 RX ORDER — RESPIRATORY SYNCYTIAL VIRUS VACCINE .06; .06 MG/.5ML; MG/.5ML
120 INJECTION, POWDER, LYOPHILIZED, FOR SOLUTION INTRAMUSCULAR
Qty: 1 | Refills: 0 | Status: ACTIVE | COMMUNITY
Start: 2024-11-18 | End: 1900-01-01

## 2024-12-10 ENCOUNTER — APPOINTMENT (OUTPATIENT)
Dept: OTOLARYNGOLOGY | Facility: CLINIC | Age: 84
End: 2024-12-10

## 2025-01-07 ENCOUNTER — NON-APPOINTMENT (OUTPATIENT)
Age: 85
End: 2025-01-07

## 2025-01-07 ENCOUNTER — APPOINTMENT (OUTPATIENT)
Dept: OTOLARYNGOLOGY | Facility: CLINIC | Age: 85
End: 2025-01-07
Payer: MEDICARE

## 2025-01-07 VITALS — HEIGHT: 65 IN | WEIGHT: 195 LBS | BODY MASS INDEX: 32.49 KG/M2

## 2025-01-07 DIAGNOSIS — Z88.6 UNSPECIFIED ASTHMA, UNCOMPLICATED: ICD-10-CM

## 2025-01-07 DIAGNOSIS — J33.9 NASAL POLYP, UNSPECIFIED: ICD-10-CM

## 2025-01-07 DIAGNOSIS — J32.4 CHRONIC PANSINUSITIS: ICD-10-CM

## 2025-01-07 DIAGNOSIS — J33.9 UNSPECIFIED ASTHMA, UNCOMPLICATED: ICD-10-CM

## 2025-01-07 DIAGNOSIS — Z87.891 PERSONAL HISTORY OF NICOTINE DEPENDENCE: ICD-10-CM

## 2025-01-07 DIAGNOSIS — J45.909 UNSPECIFIED ASTHMA, UNCOMPLICATED: ICD-10-CM

## 2025-01-07 PROCEDURE — 99213 OFFICE O/P EST LOW 20 MIN: CPT | Mod: 25

## 2025-01-07 PROCEDURE — 31231 NASAL ENDOSCOPY DX: CPT

## 2025-01-13 ENCOUNTER — APPOINTMENT (OUTPATIENT)
Dept: PULMONOLOGY | Facility: CLINIC | Age: 85
End: 2025-01-13
Payer: MEDICARE

## 2025-01-13 VITALS
HEART RATE: 87 BPM | BODY MASS INDEX: 32.49 KG/M2 | SYSTOLIC BLOOD PRESSURE: 144 MMHG | DIASTOLIC BLOOD PRESSURE: 80 MMHG | HEIGHT: 65 IN | TEMPERATURE: 97.4 F | OXYGEN SATURATION: 97 % | WEIGHT: 195 LBS

## 2025-01-13 PROCEDURE — 94010 BREATHING CAPACITY TEST: CPT

## 2025-01-13 PROCEDURE — 99213 OFFICE O/P EST LOW 20 MIN: CPT | Mod: 25

## 2025-01-13 RX ORDER — ALBUTEROL SULFATE 90 UG/1
108 (90 BASE) INHALANT RESPIRATORY (INHALATION)
Qty: 1 | Refills: 5 | Status: ACTIVE | COMMUNITY
Start: 2025-01-13 | End: 1900-01-01

## 2025-02-06 NOTE — ED ADULT NURSE NOTE - CHIEF COMPLAINT
Your prescription refill request has been sent to your provider for approval.     
The patient is a 84y Female complaining of eye discharge.

## 2025-04-01 ENCOUNTER — APPOINTMENT (OUTPATIENT)
Dept: PHARMACY | Facility: CLINIC | Age: 85
End: 2025-04-01

## 2025-07-07 ENCOUNTER — RX RENEWAL (OUTPATIENT)
Age: 85
End: 2025-07-07

## 2025-07-07 ENCOUNTER — APPOINTMENT (OUTPATIENT)
Dept: PULMONOLOGY | Facility: CLINIC | Age: 85
End: 2025-07-07
Payer: MEDICARE

## 2025-07-07 VITALS
WEIGHT: 195 LBS | HEART RATE: 95 BPM | OXYGEN SATURATION: 96 % | DIASTOLIC BLOOD PRESSURE: 70 MMHG | BODY MASS INDEX: 32.49 KG/M2 | TEMPERATURE: 97.5 F | SYSTOLIC BLOOD PRESSURE: 129 MMHG | HEIGHT: 65 IN

## 2025-07-07 PROCEDURE — 99213 OFFICE O/P EST LOW 20 MIN: CPT

## 2025-07-07 PROCEDURE — G2211 COMPLEX E/M VISIT ADD ON: CPT

## 2025-07-08 ENCOUNTER — APPOINTMENT (OUTPATIENT)
Dept: OTOLARYNGOLOGY | Facility: CLINIC | Age: 85
End: 2025-07-08
Payer: MEDICARE

## 2025-07-08 VITALS — WEIGHT: 196 LBS | BODY MASS INDEX: 33.05 KG/M2 | HEIGHT: 64.75 IN

## 2025-07-08 PROCEDURE — 31231 NASAL ENDOSCOPY DX: CPT

## 2025-07-08 PROCEDURE — 99214 OFFICE O/P EST MOD 30 MIN: CPT | Mod: 25

## 2025-09-10 ENCOUNTER — APPOINTMENT (OUTPATIENT)
Dept: OTOLARYNGOLOGY | Facility: CLINIC | Age: 85
End: 2025-09-10
Payer: MEDICARE

## 2025-09-10 VITALS
HEART RATE: 89 BPM | BODY MASS INDEX: 33.05 KG/M2 | SYSTOLIC BLOOD PRESSURE: 167 MMHG | DIASTOLIC BLOOD PRESSURE: 81 MMHG | WEIGHT: 196 LBS | HEIGHT: 64.5 IN

## 2025-09-10 DIAGNOSIS — J33.9 NASAL POLYP, UNSPECIFIED: ICD-10-CM

## 2025-09-10 DIAGNOSIS — H65.93 UNSPECIFIED NONSUPPURATIVE OTITIS MEDIA, BILATERAL: ICD-10-CM

## 2025-09-10 DIAGNOSIS — J45.50 SEVERE PERSISTENT ASTHMA, UNCOMPLICATED: ICD-10-CM

## 2025-09-10 DIAGNOSIS — R60.0 LOCALIZED EDEMA: ICD-10-CM

## 2025-09-10 DIAGNOSIS — J33.9 UNSPECIFIED ASTHMA, UNCOMPLICATED: ICD-10-CM

## 2025-09-10 DIAGNOSIS — H61.22 IMPACTED CERUMEN, LEFT EAR: ICD-10-CM

## 2025-09-10 DIAGNOSIS — J32.4 CHRONIC PANSINUSITIS: ICD-10-CM

## 2025-09-10 DIAGNOSIS — Z88.6 UNSPECIFIED ASTHMA, UNCOMPLICATED: ICD-10-CM

## 2025-09-10 DIAGNOSIS — H61.23 IMPACTED CERUMEN, BILATERAL: ICD-10-CM

## 2025-09-10 DIAGNOSIS — J45.909 UNSPECIFIED ASTHMA, UNCOMPLICATED: ICD-10-CM

## 2025-09-10 PROCEDURE — G0268 REMOVAL OF IMPACTED WAX MD: CPT

## 2025-09-10 PROCEDURE — 99214 OFFICE O/P EST MOD 30 MIN: CPT | Mod: 25

## 2025-09-10 PROCEDURE — 31231 NASAL ENDOSCOPY DX: CPT

## 2025-09-10 RX ORDER — PREDNISONE 10 MG/1
10 TABLET ORAL
Qty: 10 | Refills: 1 | Status: ACTIVE | COMMUNITY
Start: 2025-09-10 | End: 1900-01-01

## 2025-09-11 ENCOUNTER — APPOINTMENT (OUTPATIENT)
Dept: ULTRASOUND IMAGING | Facility: CLINIC | Age: 85
End: 2025-09-11
Payer: MEDICARE

## 2025-09-11 PROCEDURE — 76536 US EXAM OF HEAD AND NECK: CPT

## 2025-09-19 ENCOUNTER — APPOINTMENT (OUTPATIENT)
Dept: OTOLARYNGOLOGY | Facility: CLINIC | Age: 85
End: 2025-09-19
Payer: MEDICARE

## 2025-09-19 DIAGNOSIS — J34.89 OTHER SPECIFIED DISORDERS OF NOSE AND NASAL SINUSES: ICD-10-CM

## 2025-09-19 DIAGNOSIS — H90.8 MIXED CONDUCTIVE AND SENSORINEURAL HEARING LOSS, UNSPECIFIED: ICD-10-CM

## 2025-09-19 DIAGNOSIS — H69.93 UNSPECIFIED EUSTACHIAN TUBE DISORDER, BILATERAL: ICD-10-CM

## 2025-09-19 PROCEDURE — 99213 OFFICE O/P EST LOW 20 MIN: CPT

## 2025-09-19 PROCEDURE — G2211 COMPLEX E/M VISIT ADD ON: CPT

## (undated) DEVICE — STAPLER SKIN VISI-STAT 35 WIDE

## (undated) DEVICE — DRSG STERISTRIPS 0.5 X 4"

## (undated) DEVICE — BLADE MEDTRONIC ENT INFERIOR TURBINATE ROTATABLE STRAIGHT 2.9MM X 11CM

## (undated) DEVICE — MEDTRONIC INSTRUMENT TRACKER ENT

## (undated) DEVICE — PACK RHINOPLASTY

## (undated) DEVICE — SUT CHROMIC 4-0 18" G-3

## (undated) DEVICE — GLV 7 PROTEXIS (WHITE)

## (undated) DEVICE — SUCTION CATH ARGYLE WHISTLE TIP 14FR STRAIGHT PACKED

## (undated) DEVICE — DRSG TELFA 3 X 8

## (undated) DEVICE — SUT PROLENE 4-0 36" RB-1

## (undated) DEVICE — VENODYNE/SCD SLEEVE CALF MEDIUM

## (undated) DEVICE — Device

## (undated) DEVICE — BUR MEDTRONIC ENT TAPERED DIAMOND CHOANAL ATRESIA 15 DEGREE 4MM X 13CM

## (undated) DEVICE — MEDTRONIC AXIEM PATIENT TRACKER NON-INVASIVE

## (undated) DEVICE — PETRI DISH MED 3.5"

## (undated) DEVICE — BLADE MEDTRONIC ENT FUSION QUADCUT ROTATABLE STRAIGHT 4.3MM X 13CM

## (undated) DEVICE — ELCTR BOVIE SUCTION 8FR 6"

## (undated) DEVICE — MARKING PEN W RULER

## (undated) DEVICE — ELCTR BOVIE PENCIL BLADE 10FT

## (undated) DEVICE — DRSG MEROCEL SPLINT SILICONE

## (undated) DEVICE — SOL ANTI FOG

## (undated) DEVICE — WARMING BLANKET LOWER ADULT

## (undated) DEVICE — DRSG GAUZE SPONGE 2X2" STERILE

## (undated) DEVICE — DRAPE MAYO STAND 23"

## (undated) DEVICE — BLADE MEDTRONIC ENT RAD 90 DEGREE ROTATABLE 3.5MM X 11CM

## (undated) DEVICE — SYR ASEPTO

## (undated) DEVICE — SUT PLAIN GUT 4-0 18" SC-1

## (undated) DEVICE — SUT PROLENE 2-0 30" CT-2

## (undated) DEVICE — ACCLARENT SET INFLATION DEVICE

## (undated) DEVICE — BLADE MEDTRONIC ENT RAD 60 DEGREE ROTATABLE 4MM X 11CM

## (undated) DEVICE — SYR LUER LOK 50CC